# Patient Record
Sex: MALE | Race: WHITE | Employment: UNEMPLOYED | URBAN - METROPOLITAN AREA
[De-identification: names, ages, dates, MRNs, and addresses within clinical notes are randomized per-mention and may not be internally consistent; named-entity substitution may affect disease eponyms.]

---

## 2019-10-03 ENCOUNTER — THERAPY VISIT (OUTPATIENT)
Dept: PHYSICAL THERAPY | Facility: CLINIC | Age: 54
End: 2019-10-03
Payer: COMMERCIAL

## 2019-10-03 DIAGNOSIS — Z47.89 AFTERCARE FOLLOWING SURGERY OF THE MUSCULOSKELETAL SYSTEM: ICD-10-CM

## 2019-10-03 DIAGNOSIS — S83.282A TEAR OF LATERAL MENISCUS OF LEFT KNEE: ICD-10-CM

## 2019-10-03 PROCEDURE — 97110 THERAPEUTIC EXERCISES: CPT | Mod: GP | Performed by: PHYSICAL THERAPIST

## 2019-10-03 PROCEDURE — 97161 PT EVAL LOW COMPLEX 20 MIN: CPT | Mod: GP | Performed by: PHYSICAL THERAPIST

## 2019-10-03 NOTE — PROGRESS NOTES
"[facility] Initial Evaluation  Subjective:  Pt presents to PT s/p left knee scope lateral meniscus repair.  DOS= 10/1/19.  Pt reports that he injured the knee 2 months ago when he twisted the knee while walking and his knee \"popped out\".      The history is provided by the patient.   Type of problem:  Left knee   Condition occurred with:  A twist. This is a new condition    Patient reports pain:  Lateral.  Associated symptoms:  Loss of strength, edema and loss of motion/stiffness. Symptoms are exacerbated by activity and relieved by bracing/immobilizing and ice.                      Objective:    Gait:    Weight Bearing Status:  WBAT   Assistive Devices:  Crutches and brace                                                        Knee Evaluation:  ROM:      PROM    Hyperextension: Left: 0    Right:  8  Extension: Left: 8    Right:  0  Flexion: Left: 82    Right:  139      Strength:         Quad Set Left: Poor    Pain:           Edema:  Edema of the knee: Normal post-op swelling.            General     ROS    Assessment/Plan:    Patient is a 54 year old male with left side knee complaints.    Patient has the following significant findings with corresponding treatment plan.                Diagnosis 1:  Left lateral meniscus repair  Pain -  hot/cold therapy and education  Decreased ROM/flexibility - manual therapy, therapeutic exercise and home program  Decreased strength - therapeutic exercise, therapeutic activities and home program  Edema - cold therapy    Therapy Evaluation Codes:   1) History comprised of:   Personal factors that impact the plan of care:      None.    Comorbidity factors that impact the plan of care are:      None.     Medications impacting care: None.  2) Examination of Body Systems comprised of:   Body structures and functions that impact the plan of care:      Knee.   Activity limitations that impact the plan of care are:      Driving, Squatting/kneeling, Stairs and " Walking.  3) Clinical presentation characteristics are:   Stable/Uncomplicated.  4) Decision-Making    Low complexity using standardized patient assessment instrument and/or measureable assessment of functional outcome.  Cumulative Therapy Evaluation is: Low complexity.    Previous and current functional limitations:  (See Goal Flow Sheet for this information)    Short term and Long term goals: (See Goal Flow Sheet for this information)     Communication ability:  Patient appears to be able to clearly communicate and understand verbal and written communication and follow directions correctly.  Treatment Explanation - The following has been discussed with the patient:   RX ordered/plan of care  Anticipated outcomes  Possible risks and side effects  This patient would benefit from PT intervention to resume normal activities.   Rehab potential is good.    Frequency:  2 X week, once daily  Duration:  for 4 weeks tapering to 1 X a week over 6 weeks  Discharge Plan:  Achieve all LTG.  Independent in home treatment program.  Reach maximal therapeutic benefit.    Please refer to the daily flowsheet for treatment today, total treatment time and time spent performing 1:1 timed codes.

## 2019-10-04 ASSESSMENT — ACTIVITIES OF DAILY LIVING (ADL)
KNEEL ON THE FRONT OF YOUR KNEE: I AM UNABLE TO DO THE ACTIVITY
KNEE_ACTIVITY_OF_DAILY_LIVING_SUM: 16
STAND: I AM UNABLE TO DO THE ACTIVITY
SIT WITH YOUR KNEE BENT: I AM UNABLE TO DO THE ACTIVITY
WALK: I AM UNABLE TO DO THE ACTIVITY
HOW_WOULD_YOU_RATE_THE_OVERALL_FUNCTION_OF_YOUR_KNEE_DURING_YOUR_USUAL_DAILY_ACTIVITIES?: SEVERELY ABNORMAL
PAIN: THE SYMPTOM AFFECTS MY ACTIVITY SLIGHTLY
LIMPING: THE SYMPTOM AFFECTS MY ACTIVITY MODERATELY
AS_A_RESULT_OF_YOUR_KNEE_INJURY,_HOW_WOULD_YOU_RATE_YOUR_CURRENT_LEVEL_OF_DAILY_ACTIVITY?: SEVERELY ABNORMAL
GIVING WAY, BUCKLING OR SHIFTING OF KNEE: THE SYMPTOM AFFECTS MY ACTIVITY MODERATELY
SWELLING: THE SYMPTOM AFFECTS MY ACTIVITY MODERATELY
RAW_SCORE: 16
RISE FROM A CHAIR: I AM UNABLE TO DO THE ACTIVITY
GO UP STAIRS: ACTIVITY IS VERY DIFFICULT
GO DOWN STAIRS: I AM UNABLE TO DO THE ACTIVITY
STIFFNESS: I HAVE THE SYMPTOM BUT IT DOES NOT AFFECT MY ACTIVITY
WEAKNESS: THE SYMPTOM AFFECTS MY ACTIVITY MODERATELY
SQUAT: I AM UNABLE TO DO THE ACTIVITY
KNEE_ACTIVITY_OF_DAILY_LIVING_SCORE: 22.86
HOW_WOULD_YOU_RATE_THE_CURRENT_FUNCTION_OF_YOUR_KNEE_DURING_YOUR_USUAL_DAILY_ACTIVITIES_ON_A_SCALE_FROM_0_TO_100_WITH_100_BEING_YOUR_LEVEL_OF_KNEE_FUNCTION_PRIOR_TO_YOUR_INJURY_AND_0_BEING_THE_INABILITY_TO_PERFORM_ANY_OF_YOUR_USUAL_DAILY_ACTIVITIES?: 10

## 2019-10-04 NOTE — PROGRESS NOTES
[facility] Initial Evaluation  Subjective:                           Patient is retired.                           Objective:  System    Physical Exam    General     ROS    Assessment/Plan:

## 2019-10-07 ENCOUNTER — THERAPY VISIT (OUTPATIENT)
Dept: PHYSICAL THERAPY | Facility: CLINIC | Age: 54
End: 2019-10-07
Payer: COMMERCIAL

## 2019-10-07 DIAGNOSIS — Z47.89 AFTERCARE FOLLOWING SURGERY OF THE MUSCULOSKELETAL SYSTEM: ICD-10-CM

## 2019-10-07 DIAGNOSIS — S83.282A TEAR OF LATERAL MENISCUS OF LEFT KNEE: ICD-10-CM

## 2019-10-07 PROCEDURE — 97110 THERAPEUTIC EXERCISES: CPT | Mod: GP | Performed by: PHYSICAL THERAPIST

## 2019-10-11 ENCOUNTER — THERAPY VISIT (OUTPATIENT)
Dept: PHYSICAL THERAPY | Facility: CLINIC | Age: 54
End: 2019-10-11
Payer: COMMERCIAL

## 2019-10-11 DIAGNOSIS — S83.282A TEAR OF LATERAL MENISCUS OF LEFT KNEE: ICD-10-CM

## 2019-10-11 DIAGNOSIS — Z47.89 AFTERCARE FOLLOWING SURGERY OF THE MUSCULOSKELETAL SYSTEM: ICD-10-CM

## 2019-10-11 PROCEDURE — 97110 THERAPEUTIC EXERCISES: CPT | Mod: GP

## 2019-10-21 ENCOUNTER — ANESTHESIA (OUTPATIENT)
Dept: SURGERY | Facility: CLINIC | Age: 54
DRG: 857 | End: 2019-10-21
Payer: COMMERCIAL

## 2019-10-21 ENCOUNTER — HOSPITAL ENCOUNTER (INPATIENT)
Facility: CLINIC | Age: 54
LOS: 3 days | Discharge: HOME HEALTH | DRG: 857 | End: 2019-10-24
Attending: ORTHOPAEDIC SURGERY | Admitting: ORTHOPAEDIC SURGERY
Payer: COMMERCIAL

## 2019-10-21 ENCOUNTER — ANESTHESIA EVENT (OUTPATIENT)
Dept: SURGERY | Facility: CLINIC | Age: 54
DRG: 857 | End: 2019-10-21
Payer: COMMERCIAL

## 2019-10-21 DIAGNOSIS — Z98.890 S/P ARTHROSCOPIC KNEE SURGERY: Primary | ICD-10-CM

## 2019-10-21 LAB
APPEARANCE FLD: NORMAL
COLOR FLD: YELLOW
MONOS+MACROS NFR FLD MANUAL: 3 %
NEUTS BAND NFR FLD MANUAL: 97 %
SPECIMEN SOURCE FLD: NORMAL
WBC # FLD AUTO: NORMAL /UL

## 2019-10-21 PROCEDURE — 37000009 ZZH ANESTHESIA TECHNICAL FEE, EACH ADDTL 15 MIN: Performed by: ORTHOPAEDIC SURGERY

## 2019-10-21 PROCEDURE — 37000008 ZZH ANESTHESIA TECHNICAL FEE, 1ST 30 MIN: Performed by: ORTHOPAEDIC SURGERY

## 2019-10-21 PROCEDURE — 27110028 ZZH OR GENERAL SUPPLY NON-STERILE: Performed by: ORTHOPAEDIC SURGERY

## 2019-10-21 PROCEDURE — 25000128 H RX IP 250 OP 636: Performed by: NURSE ANESTHETIST, CERTIFIED REGISTERED

## 2019-10-21 PROCEDURE — 36000059 ZZH SURGERY LEVEL 3 EA 15 ADDTL MIN UMMC: Performed by: ORTHOPAEDIC SURGERY

## 2019-10-21 PROCEDURE — 87077 CULTURE AEROBIC IDENTIFY: CPT | Performed by: ORTHOPAEDIC SURGERY

## 2019-10-21 PROCEDURE — 25000132 ZZH RX MED GY IP 250 OP 250 PS 637: Performed by: PHYSICIAN ASSISTANT

## 2019-10-21 PROCEDURE — 87186 SC STD MICRODIL/AGAR DIL: CPT | Performed by: ORTHOPAEDIC SURGERY

## 2019-10-21 PROCEDURE — 87070 CULTURE OTHR SPECIMN AEROBIC: CPT | Performed by: ORTHOPAEDIC SURGERY

## 2019-10-21 PROCEDURE — 00000146 ZZHCL STATISTIC GLUCOSE BY METER IP

## 2019-10-21 PROCEDURE — 25000566 ZZH SEVOFLURANE, EA 15 MIN: Performed by: ORTHOPAEDIC SURGERY

## 2019-10-21 PROCEDURE — 71000015 ZZH RECOVERY PHASE 1 LEVEL 2 EA ADDTL HR: Performed by: ORTHOPAEDIC SURGERY

## 2019-10-21 PROCEDURE — 25000128 H RX IP 250 OP 636: Performed by: STUDENT IN AN ORGANIZED HEALTH CARE EDUCATION/TRAINING PROGRAM

## 2019-10-21 PROCEDURE — 25000125 ZZHC RX 250: Performed by: ORTHOPAEDIC SURGERY

## 2019-10-21 PROCEDURE — 71000014 ZZH RECOVERY PHASE 1 LEVEL 2 FIRST HR: Performed by: ORTHOPAEDIC SURGERY

## 2019-10-21 PROCEDURE — 12000001 ZZH R&B MED SURG/OB UMMC

## 2019-10-21 PROCEDURE — 87015 SPECIMEN INFECT AGNT CONCNTJ: CPT | Performed by: ORTHOPAEDIC SURGERY

## 2019-10-21 PROCEDURE — 27210794 ZZH OR GENERAL SUPPLY STERILE: Performed by: ORTHOPAEDIC SURGERY

## 2019-10-21 PROCEDURE — 87075 CULTR BACTERIA EXCEPT BLOOD: CPT | Performed by: ORTHOPAEDIC SURGERY

## 2019-10-21 PROCEDURE — 25000132 ZZH RX MED GY IP 250 OP 250 PS 637: Performed by: STUDENT IN AN ORGANIZED HEALTH CARE EDUCATION/TRAINING PROGRAM

## 2019-10-21 PROCEDURE — 25000125 ZZHC RX 250: Performed by: NURSE ANESTHETIST, CERTIFIED REGISTERED

## 2019-10-21 PROCEDURE — 25800030 ZZH RX IP 258 OP 636: Performed by: STUDENT IN AN ORGANIZED HEALTH CARE EDUCATION/TRAINING PROGRAM

## 2019-10-21 PROCEDURE — 40000170 ZZH STATISTIC PRE-PROCEDURE ASSESSMENT II: Performed by: ORTHOPAEDIC SURGERY

## 2019-10-21 PROCEDURE — 89051 BODY FLUID CELL COUNT: CPT | Performed by: ORTHOPAEDIC SURGERY

## 2019-10-21 PROCEDURE — 87205 SMEAR GRAM STAIN: CPT | Performed by: ORTHOPAEDIC SURGERY

## 2019-10-21 PROCEDURE — 0SBD4ZZ EXCISION OF LEFT KNEE JOINT, PERCUTANEOUS ENDOSCOPIC APPROACH: ICD-10-PCS | Performed by: ORTHOPAEDIC SURGERY

## 2019-10-21 PROCEDURE — 36000057 ZZH SURGERY LEVEL 3 1ST 30 MIN - UMMC: Performed by: ORTHOPAEDIC SURGERY

## 2019-10-21 RX ORDER — SODIUM CHLORIDE, SODIUM LACTATE, POTASSIUM CHLORIDE, CALCIUM CHLORIDE 600; 310; 30; 20 MG/100ML; MG/100ML; MG/100ML; MG/100ML
INJECTION, SOLUTION INTRAVENOUS CONTINUOUS
Status: DISCONTINUED | OUTPATIENT
Start: 2019-10-21 | End: 2019-10-21 | Stop reason: HOSPADM

## 2019-10-21 RX ORDER — OXYCODONE HYDROCHLORIDE 5 MG/1
5 TABLET ORAL EVERY 4 HOURS PRN
Status: DISCONTINUED | OUTPATIENT
Start: 2019-10-21 | End: 2019-10-22 | Stop reason: ALTCHOICE

## 2019-10-21 RX ORDER — ACETAMINOPHEN 325 MG/1
975 TABLET ORAL ONCE
Status: COMPLETED | OUTPATIENT
Start: 2019-10-21 | End: 2019-10-21

## 2019-10-21 RX ORDER — ACETAMINOPHEN 325 MG/1
975 TABLET ORAL ONCE
Status: CANCELLED | OUTPATIENT
Start: 2019-10-21 | End: 2019-10-21

## 2019-10-21 RX ORDER — SODIUM CHLORIDE, SODIUM LACTATE, POTASSIUM CHLORIDE, CALCIUM CHLORIDE 600; 310; 30; 20 MG/100ML; MG/100ML; MG/100ML; MG/100ML
INJECTION, SOLUTION INTRAVENOUS CONTINUOUS
Status: DISCONTINUED | OUTPATIENT
Start: 2019-10-21 | End: 2019-10-22 | Stop reason: HOSPADM

## 2019-10-21 RX ORDER — ONDANSETRON 2 MG/ML
4 INJECTION INTRAMUSCULAR; INTRAVENOUS EVERY 30 MIN PRN
Status: DISCONTINUED | OUTPATIENT
Start: 2019-10-21 | End: 2019-10-22 | Stop reason: HOSPADM

## 2019-10-21 RX ORDER — NALOXONE HYDROCHLORIDE 0.4 MG/ML
.1-.4 INJECTION, SOLUTION INTRAMUSCULAR; INTRAVENOUS; SUBCUTANEOUS
Status: DISCONTINUED | OUTPATIENT
Start: 2019-10-21 | End: 2019-10-22 | Stop reason: HOSPADM

## 2019-10-21 RX ORDER — OXYCODONE HYDROCHLORIDE 5 MG/1
5-10 TABLET ORAL
Status: DISCONTINUED | OUTPATIENT
Start: 2019-10-21 | End: 2019-10-24 | Stop reason: HOSPADM

## 2019-10-21 RX ORDER — HYDROMORPHONE HYDROCHLORIDE 1 MG/ML
.2-.4 INJECTION, SOLUTION INTRAMUSCULAR; INTRAVENOUS; SUBCUTANEOUS EVERY 10 MIN PRN
Status: DISCONTINUED | OUTPATIENT
Start: 2019-10-21 | End: 2019-10-22 | Stop reason: HOSPADM

## 2019-10-21 RX ORDER — ONDANSETRON 4 MG/1
4 TABLET, ORALLY DISINTEGRATING ORAL EVERY 30 MIN PRN
Status: DISCONTINUED | OUTPATIENT
Start: 2019-10-21 | End: 2019-10-22 | Stop reason: HOSPADM

## 2019-10-21 RX ORDER — ACETAMINOPHEN 325 MG/1
975 TABLET ORAL EVERY 8 HOURS
Status: DISCONTINUED | OUTPATIENT
Start: 2019-10-21 | End: 2019-10-24 | Stop reason: HOSPADM

## 2019-10-21 RX ORDER — LIDOCAINE HYDROCHLORIDE 20 MG/ML
INJECTION, SOLUTION INFILTRATION; PERINEURAL PRN
Status: DISCONTINUED | OUTPATIENT
Start: 2019-10-21 | End: 2019-10-21

## 2019-10-21 RX ORDER — MEPERIDINE HYDROCHLORIDE 25 MG/ML
12.5 INJECTION INTRAMUSCULAR; INTRAVENOUS; SUBCUTANEOUS
Status: DISCONTINUED | OUTPATIENT
Start: 2019-10-21 | End: 2019-10-22 | Stop reason: HOSPADM

## 2019-10-21 RX ORDER — FENTANYL CITRATE 50 UG/ML
50 INJECTION, SOLUTION INTRAMUSCULAR; INTRAVENOUS EVERY 5 MIN PRN
Status: COMPLETED | OUTPATIENT
Start: 2019-10-21 | End: 2019-10-21

## 2019-10-21 RX ORDER — QUETIAPINE FUMARATE 25 MG/1
25 TABLET, FILM COATED ORAL AT BEDTIME
COMMUNITY

## 2019-10-21 RX ORDER — BUPIVACAINE HYDROCHLORIDE AND EPINEPHRINE 2.5; 5 MG/ML; UG/ML
INJECTION, SOLUTION INFILTRATION; PERINEURAL PRN
Status: DISCONTINUED | OUTPATIENT
Start: 2019-10-21 | End: 2019-10-22 | Stop reason: HOSPADM

## 2019-10-21 RX ORDER — FENTANYL CITRATE 50 UG/ML
25-50 INJECTION, SOLUTION INTRAMUSCULAR; INTRAVENOUS EVERY 5 MIN PRN
Status: DISCONTINUED | OUTPATIENT
Start: 2019-10-21 | End: 2019-10-22 | Stop reason: HOSPADM

## 2019-10-21 RX ORDER — HYDROMORPHONE HYDROCHLORIDE 1 MG/ML
.3-.5 INJECTION, SOLUTION INTRAMUSCULAR; INTRAVENOUS; SUBCUTANEOUS
Status: DISCONTINUED | OUTPATIENT
Start: 2019-10-21 | End: 2019-10-23

## 2019-10-21 RX ORDER — DEXAMETHASONE SODIUM PHOSPHATE 4 MG/ML
INJECTION, SOLUTION INTRA-ARTICULAR; INTRALESIONAL; INTRAMUSCULAR; INTRAVENOUS; SOFT TISSUE PRN
Status: DISCONTINUED | OUTPATIENT
Start: 2019-10-21 | End: 2019-10-21

## 2019-10-21 RX ORDER — ACETAMINOPHEN 500 MG
1000 TABLET ORAL 3 TIMES DAILY PRN
Status: ON HOLD | COMMUNITY
End: 2019-10-24

## 2019-10-21 RX ORDER — CEFAZOLIN SODIUM 2 G/100ML
INJECTION, SOLUTION INTRAVENOUS PRN
Status: DISCONTINUED | OUTPATIENT
Start: 2019-10-21 | End: 2019-10-21

## 2019-10-21 RX ORDER — FENTANYL CITRATE 50 UG/ML
INJECTION, SOLUTION INTRAMUSCULAR; INTRAVENOUS PRN
Status: DISCONTINUED | OUTPATIENT
Start: 2019-10-21 | End: 2019-10-21

## 2019-10-21 RX ORDER — PROPOFOL 10 MG/ML
INJECTION, EMULSION INTRAVENOUS PRN
Status: DISCONTINUED | OUTPATIENT
Start: 2019-10-21 | End: 2019-10-21

## 2019-10-21 RX ADMIN — OXYCODONE HYDROCHLORIDE 5 MG: 5 TABLET ORAL at 23:16

## 2019-10-21 RX ADMIN — FENTANYL CITRATE 50 MCG: 50 INJECTION, SOLUTION INTRAMUSCULAR; INTRAVENOUS at 21:09

## 2019-10-21 RX ADMIN — FENTANYL CITRATE 50 MCG: 50 INJECTION INTRAMUSCULAR; INTRAVENOUS at 18:58

## 2019-10-21 RX ADMIN — FENTANYL CITRATE 50 MCG: 50 INJECTION, SOLUTION INTRAMUSCULAR; INTRAVENOUS at 21:01

## 2019-10-21 RX ADMIN — HYDROMORPHONE HYDROCHLORIDE 0.2 MG: 1 INJECTION, SOLUTION INTRAMUSCULAR; INTRAVENOUS; SUBCUTANEOUS at 22:31

## 2019-10-21 RX ADMIN — ONDANSETRON 4 MG: 2 INJECTION INTRAMUSCULAR; INTRAVENOUS at 21:37

## 2019-10-21 RX ADMIN — LIDOCAINE HYDROCHLORIDE 100 MG: 20 INJECTION, SOLUTION INFILTRATION; PERINEURAL at 20:40

## 2019-10-21 RX ADMIN — HYDROMORPHONE HYDROCHLORIDE 0.3 MG: 1 INJECTION, SOLUTION INTRAMUSCULAR; INTRAVENOUS; SUBCUTANEOUS at 22:45

## 2019-10-21 RX ADMIN — FENTANYL CITRATE 50 MCG: 50 INJECTION INTRAMUSCULAR; INTRAVENOUS at 18:53

## 2019-10-21 RX ADMIN — DEXAMETHASONE SODIUM PHOSPHATE 4 MG: 4 INJECTION, SOLUTION INTRAMUSCULAR; INTRAVENOUS at 20:52

## 2019-10-21 RX ADMIN — MIDAZOLAM 2 MG: 1 INJECTION INTRAMUSCULAR; INTRAVENOUS at 20:36

## 2019-10-21 RX ADMIN — ACETAMINOPHEN 975 MG: 325 TABLET, FILM COATED ORAL at 18:53

## 2019-10-21 RX ADMIN — CEFAZOLIN SODIUM 2 G: 2 INJECTION, SOLUTION INTRAVENOUS at 21:06

## 2019-10-21 RX ADMIN — FENTANYL CITRATE 25 MCG: 50 INJECTION INTRAMUSCULAR; INTRAVENOUS at 22:01

## 2019-10-21 RX ADMIN — FENTANYL CITRATE 100 MCG: 50 INJECTION, SOLUTION INTRAMUSCULAR; INTRAVENOUS at 20:40

## 2019-10-21 RX ADMIN — SODIUM CHLORIDE, POTASSIUM CHLORIDE, SODIUM LACTATE AND CALCIUM CHLORIDE: 600; 310; 30; 20 INJECTION, SOLUTION INTRAVENOUS at 21:22

## 2019-10-21 RX ADMIN — PROPOFOL 200 MG: 10 INJECTION, EMULSION INTRAVENOUS at 20:42

## 2019-10-21 RX ADMIN — FENTANYL CITRATE 50 MCG: 50 INJECTION INTRAMUSCULAR; INTRAVENOUS at 22:12

## 2019-10-21 RX ADMIN — SODIUM CHLORIDE, POTASSIUM CHLORIDE, SODIUM LACTATE AND CALCIUM CHLORIDE: 600; 310; 30; 20 INJECTION, SOLUTION INTRAVENOUS at 20:36

## 2019-10-21 ASSESSMENT — LIFESTYLE VARIABLES: TOBACCO_USE: 1

## 2019-10-21 ASSESSMENT — MIFFLIN-ST. JEOR: SCORE: 1952.56

## 2019-10-22 ENCOUNTER — APPOINTMENT (OUTPATIENT)
Dept: PHYSICAL THERAPY | Facility: CLINIC | Age: 54
DRG: 857 | End: 2019-10-22
Attending: ORTHOPAEDIC SURGERY
Payer: COMMERCIAL

## 2019-10-22 PROBLEM — Z98.890 S/P ARTHROSCOPIC KNEE SURGERY: Status: ACTIVE | Noted: 2019-10-22

## 2019-10-22 LAB
ALBUMIN SERPL-MCNC: 2.3 G/DL (ref 3.4–5)
ALP SERPL-CCNC: 80 U/L (ref 40–150)
ALT SERPL W P-5'-P-CCNC: 18 U/L (ref 0–70)
ANION GAP SERPL CALCULATED.3IONS-SCNC: 6 MMOL/L (ref 3–14)
AST SERPL W P-5'-P-CCNC: 14 U/L (ref 0–45)
BASOPHILS # BLD AUTO: 0 10E9/L (ref 0–0.2)
BASOPHILS NFR BLD AUTO: 0.1 %
BILIRUB DIRECT SERPL-MCNC: <0.1 MG/DL (ref 0–0.2)
BILIRUB SERPL-MCNC: 0.2 MG/DL (ref 0.2–1.3)
BUN SERPL-MCNC: 16 MG/DL (ref 7–30)
CALCIUM SERPL-MCNC: 8.3 MG/DL (ref 8.5–10.1)
CHLORIDE SERPL-SCNC: 99 MMOL/L (ref 94–109)
CO2 SERPL-SCNC: 28 MMOL/L (ref 20–32)
CREAT SERPL-MCNC: 0.88 MG/DL (ref 0.66–1.25)
CRP SERPL-MCNC: 270 MG/L (ref 0–8)
DIFFERENTIAL METHOD BLD: ABNORMAL
EOSINOPHIL # BLD AUTO: 0 10E9/L (ref 0–0.7)
EOSINOPHIL NFR BLD AUTO: 0.1 %
ERYTHROCYTE [DISTWIDTH] IN BLOOD BY AUTOMATED COUNT: 12 % (ref 10–15)
ERYTHROCYTE [SEDIMENTATION RATE] IN BLOOD BY WESTERGREN METHOD: 70 MM/H (ref 0–20)
GFR SERPL CREATININE-BSD FRML MDRD: >90 ML/MIN/{1.73_M2}
GLUCOSE BLDC GLUCOMTR-MCNC: 98 MG/DL (ref 70–99)
GLUCOSE SERPL-MCNC: 209 MG/DL (ref 70–99)
GRAM STN SPEC: NORMAL
HCT VFR BLD AUTO: 36.6 % (ref 40–53)
HGB BLD-MCNC: 11.9 G/DL (ref 13.3–17.7)
IMM GRANULOCYTES # BLD: 0 10E9/L (ref 0–0.4)
IMM GRANULOCYTES NFR BLD: 0.1 %
LYMPHOCYTES # BLD AUTO: 0.7 10E9/L (ref 0.8–5.3)
LYMPHOCYTES NFR BLD AUTO: 7 %
MCH RBC QN AUTO: 31.3 PG (ref 26.5–33)
MCHC RBC AUTO-ENTMCNC: 32.5 G/DL (ref 31.5–36.5)
MCV RBC AUTO: 96 FL (ref 78–100)
MONOCYTES # BLD AUTO: 1.2 10E9/L (ref 0–1.3)
MONOCYTES NFR BLD AUTO: 11.8 %
NEUTROPHILS # BLD AUTO: 8.1 10E9/L (ref 1.6–8.3)
NEUTROPHILS NFR BLD AUTO: 80.9 %
NRBC # BLD AUTO: 0 10*3/UL
NRBC BLD AUTO-RTO: 0 /100
PLATELET # BLD AUTO: 465 10E9/L (ref 150–450)
POTASSIUM SERPL-SCNC: 4.6 MMOL/L (ref 3.4–5.3)
PROT SERPL-MCNC: 6.6 G/DL (ref 6.8–8.8)
RBC # BLD AUTO: 3.8 10E12/L (ref 4.4–5.9)
SODIUM SERPL-SCNC: 133 MMOL/L (ref 133–144)
SPECIMEN SOURCE: NORMAL
WBC # BLD AUTO: 10.1 10E9/L (ref 4–11)

## 2019-10-22 PROCEDURE — 99222 1ST HOSP IP/OBS MODERATE 55: CPT | Performed by: PHYSICIAN ASSISTANT

## 2019-10-22 PROCEDURE — 25800030 ZZH RX IP 258 OP 636: Performed by: ORTHOPAEDIC SURGERY

## 2019-10-22 PROCEDURE — 97116 GAIT TRAINING THERAPY: CPT | Mod: GP

## 2019-10-22 PROCEDURE — 80048 BASIC METABOLIC PNL TOTAL CA: CPT | Performed by: ORTHOPAEDIC SURGERY

## 2019-10-22 PROCEDURE — 85652 RBC SED RATE AUTOMATED: CPT | Performed by: ORTHOPAEDIC SURGERY

## 2019-10-22 PROCEDURE — 25000132 ZZH RX MED GY IP 250 OP 250 PS 637: Performed by: ORTHOPAEDIC SURGERY

## 2019-10-22 PROCEDURE — 97530 THERAPEUTIC ACTIVITIES: CPT | Mod: GP

## 2019-10-22 PROCEDURE — 36415 COLL VENOUS BLD VENIPUNCTURE: CPT | Performed by: ORTHOPAEDIC SURGERY

## 2019-10-22 PROCEDURE — 25000128 H RX IP 250 OP 636: Performed by: ORTHOPAEDIC SURGERY

## 2019-10-22 PROCEDURE — 25000132 ZZH RX MED GY IP 250 OP 250 PS 637: Performed by: STUDENT IN AN ORGANIZED HEALTH CARE EDUCATION/TRAINING PROGRAM

## 2019-10-22 PROCEDURE — 97161 PT EVAL LOW COMPLEX 20 MIN: CPT | Mod: GP

## 2019-10-22 PROCEDURE — 85025 COMPLETE CBC W/AUTO DIFF WBC: CPT | Performed by: ORTHOPAEDIC SURGERY

## 2019-10-22 PROCEDURE — 12000001 ZZH R&B MED SURG/OB UMMC

## 2019-10-22 PROCEDURE — 80076 HEPATIC FUNCTION PANEL: CPT | Performed by: ORTHOPAEDIC SURGERY

## 2019-10-22 PROCEDURE — 99207 ZZC CONSULT E&M CHANGED TO INITIAL LEVEL: CPT | Performed by: PHYSICIAN ASSISTANT

## 2019-10-22 PROCEDURE — 86140 C-REACTIVE PROTEIN: CPT | Performed by: ORTHOPAEDIC SURGERY

## 2019-10-22 RX ORDER — CEFAZOLIN SODIUM 2 G/100ML
2 INJECTION, SOLUTION INTRAVENOUS EVERY 8 HOURS
Status: DISCONTINUED | OUTPATIENT
Start: 2019-10-22 | End: 2019-10-22

## 2019-10-22 RX ORDER — BISACODYL 10 MG
10 SUPPOSITORY, RECTAL RECTAL DAILY
Status: DISCONTINUED | OUTPATIENT
Start: 2019-10-22 | End: 2019-10-24 | Stop reason: HOSPADM

## 2019-10-22 RX ORDER — SODIUM CHLORIDE, SODIUM LACTATE, POTASSIUM CHLORIDE, CALCIUM CHLORIDE 600; 310; 30; 20 MG/100ML; MG/100ML; MG/100ML; MG/100ML
INJECTION, SOLUTION INTRAVENOUS CONTINUOUS
Status: DISCONTINUED | OUTPATIENT
Start: 2019-10-22 | End: 2019-10-22

## 2019-10-22 RX ORDER — AMOXICILLIN 250 MG
1 CAPSULE ORAL 2 TIMES DAILY
Status: DISCONTINUED | OUTPATIENT
Start: 2019-10-22 | End: 2019-10-24 | Stop reason: HOSPADM

## 2019-10-22 RX ORDER — AMOXICILLIN 250 MG
2 CAPSULE ORAL 2 TIMES DAILY
Status: DISCONTINUED | OUTPATIENT
Start: 2019-10-22 | End: 2019-10-24 | Stop reason: HOSPADM

## 2019-10-22 RX ORDER — QUETIAPINE FUMARATE 25 MG/1
25 TABLET, FILM COATED ORAL AT BEDTIME
Status: DISCONTINUED | OUTPATIENT
Start: 2019-10-22 | End: 2019-10-24 | Stop reason: HOSPADM

## 2019-10-22 RX ORDER — PROCHLORPERAZINE MALEATE 5 MG
10 TABLET ORAL EVERY 6 HOURS PRN
Status: DISCONTINUED | OUTPATIENT
Start: 2019-10-22 | End: 2019-10-24 | Stop reason: HOSPADM

## 2019-10-22 RX ORDER — ONDANSETRON 4 MG/1
4 TABLET, ORALLY DISINTEGRATING ORAL EVERY 6 HOURS PRN
Status: DISCONTINUED | OUTPATIENT
Start: 2019-10-22 | End: 2019-10-24 | Stop reason: HOSPADM

## 2019-10-22 RX ORDER — LANOLIN ALCOHOL/MO/W.PET/CERES
3 CREAM (GRAM) TOPICAL
Status: DISCONTINUED | OUTPATIENT
Start: 2019-10-22 | End: 2019-10-24 | Stop reason: HOSPADM

## 2019-10-22 RX ORDER — NICOTINE 21 MG/24HR
1 PATCH, TRANSDERMAL 24 HOURS TRANSDERMAL DAILY
Status: DISCONTINUED | OUTPATIENT
Start: 2019-10-22 | End: 2019-10-24 | Stop reason: HOSPADM

## 2019-10-22 RX ORDER — CALCIUM CARBONATE 500 MG/1
1000 TABLET, CHEWABLE ORAL 4 TIMES DAILY PRN
Status: DISCONTINUED | OUTPATIENT
Start: 2019-10-22 | End: 2019-10-24 | Stop reason: HOSPADM

## 2019-10-22 RX ORDER — ONDANSETRON 2 MG/ML
4 INJECTION INTRAMUSCULAR; INTRAVENOUS EVERY 6 HOURS PRN
Status: DISCONTINUED | OUTPATIENT
Start: 2019-10-22 | End: 2019-10-24 | Stop reason: HOSPADM

## 2019-10-22 RX ORDER — LIDOCAINE 40 MG/G
CREAM TOPICAL
Status: DISCONTINUED | OUTPATIENT
Start: 2019-10-22 | End: 2019-10-24

## 2019-10-22 RX ORDER — NALOXONE HYDROCHLORIDE 0.4 MG/ML
.1-.4 INJECTION, SOLUTION INTRAMUSCULAR; INTRAVENOUS; SUBCUTANEOUS
Status: DISCONTINUED | OUTPATIENT
Start: 2019-10-22 | End: 2019-10-24 | Stop reason: HOSPADM

## 2019-10-22 RX ADMIN — SENNOSIDES AND DOCUSATE SODIUM 2 TABLET: 8.6; 5 TABLET ORAL at 20:06

## 2019-10-22 RX ADMIN — ACETAMINOPHEN 975 MG: 325 TABLET, FILM COATED ORAL at 01:15

## 2019-10-22 RX ADMIN — OXYCODONE HYDROCHLORIDE 10 MG: 5 TABLET ORAL at 23:48

## 2019-10-22 RX ADMIN — CEFAZOLIN SODIUM 2 G: 2 INJECTION, SOLUTION INTRAVENOUS at 13:24

## 2019-10-22 RX ADMIN — OXYCODONE HYDROCHLORIDE 10 MG: 5 TABLET ORAL at 20:06

## 2019-10-22 RX ADMIN — OXYCODONE HYDROCHLORIDE 5 MG: 5 TABLET ORAL at 01:15

## 2019-10-22 RX ADMIN — ACETAMINOPHEN 975 MG: 325 TABLET, FILM COATED ORAL at 16:19

## 2019-10-22 RX ADMIN — OXYCODONE HYDROCHLORIDE 10 MG: 5 TABLET ORAL at 08:04

## 2019-10-22 RX ADMIN — SODIUM CHLORIDE, POTASSIUM CHLORIDE, SODIUM LACTATE AND CALCIUM CHLORIDE: 600; 310; 30; 20 INJECTION, SOLUTION INTRAVENOUS at 04:49

## 2019-10-22 RX ADMIN — OXYCODONE HYDROCHLORIDE 10 MG: 5 TABLET ORAL at 13:22

## 2019-10-22 RX ADMIN — OMEPRAZOLE 20 MG: 20 CAPSULE, DELAYED RELEASE ORAL at 08:04

## 2019-10-22 RX ADMIN — OXYCODONE HYDROCHLORIDE 10 MG: 5 TABLET ORAL at 04:50

## 2019-10-22 RX ADMIN — OXYCODONE HYDROCHLORIDE 10 MG: 5 TABLET ORAL at 16:19

## 2019-10-22 RX ADMIN — VANCOMYCIN HYDROCHLORIDE 2000 MG: 10 INJECTION, POWDER, LYOPHILIZED, FOR SOLUTION INTRAVENOUS at 17:34

## 2019-10-22 RX ADMIN — SENNOSIDES AND DOCUSATE SODIUM 2 TABLET: 8.6; 5 TABLET ORAL at 08:04

## 2019-10-22 RX ADMIN — ACETAMINOPHEN 975 MG: 325 TABLET, FILM COATED ORAL at 08:04

## 2019-10-22 RX ADMIN — QUETIAPINE FUMARATE 25 MG: 25 TABLET ORAL at 01:15

## 2019-10-22 RX ADMIN — ASPIRIN 325 MG: 325 TABLET, DELAYED RELEASE ORAL at 08:04

## 2019-10-22 RX ADMIN — QUETIAPINE FUMARATE 25 MG: 25 TABLET ORAL at 22:07

## 2019-10-22 RX ADMIN — CEFAZOLIN SODIUM 2 G: 2 INJECTION, SOLUTION INTRAVENOUS at 04:49

## 2019-10-22 RX ADMIN — NICOTINE 1 PATCH: 14 PATCH, EXTENDED RELEASE TRANSDERMAL at 08:04

## 2019-10-22 RX ADMIN — ACETAMINOPHEN 975 MG: 325 TABLET, FILM COATED ORAL at 23:48

## 2019-10-22 ASSESSMENT — ACTIVITIES OF DAILY LIVING (ADL)
ADLS_ACUITY_SCORE: 12
ADLS_ACUITY_SCORE: 20
DRESS: 0-->INDEPENDENT
ADLS_ACUITY_SCORE: 20
ADLS_ACUITY_SCORE: 12
RETIRED_EATING: 0-->INDEPENDENT
ADLS_ACUITY_SCORE: 12

## 2019-10-22 NOTE — BRIEF OP NOTE
[facility], [place]    Brief Operative Note    Pre-operative diagnosis: Septic arthritis left knee s/p inside out lateral meniscus repair  Post-operative diagnosis Same as pre-operative diagnosis    Procedure: Procedure(s):  IRRIGATION AND DEBRIDEMENT, KNEE, ARTHROSCOPIC  Surgeon: Surgeon(s) and Role:     * Bryan Spain MD - Primary     * Matt Zurita MD - Resident - Assisting  Anesthesia: General   Estimated blood loss: Minimal  Drains: None  Specimens:   ID Type Source Tests Collected by Time Destination   1 : Left Knee Fluid Fluid Knee, Left ANAEROBIC BACTERIAL CULTURE, CELL COUNT WITH DIFFERENTIAL FLUID, FLUID CULTURE AEROBIC BACTERIAL, GRAM STAIN Bryan Spain MD 10/21/2019  9:00 PM      Findings:   Purulent, cloudy fluid left knee upon scope entry   Complications: None.  Implants: * No implants in log *    Post-Op Plan:  Assessment/Plan: Alexis Patiño is a 54 year old male with a presumed septic arthritis of the left knee s/p arthroscopic I&D on 10/21 with Dr. Spain. Patient is now nearly 3 weeks s/p left knee arthroscopy, inside out repair lateral meniscus with Dr. Spain on 10/1/19.     Activity: Up with assist and assistive devices as needed  Weight bearing status: WBAT when in full extension  Brace: KI overnight in extension to quiet joint down, then meniscus repair protocol   -transition to hinged knee brace in 24h   -then, WBAT locked in extension when mobilizing   -0-90 ROM when working with therapy and at rest/in chair  Antibiotics: Ancef continuous; appreciate ID recommendations   Tailor to cultures    PICC likely to be needed   Diet: Begin with clear fluids and progress diet as tolerated. Bowel regimen. Anti-emetics PRN.   DVT prophylaxis: mechanical and ASA while inpatient,  discharge on ASA 325mg daily x 2 weeks.  Elevation: Elevate heels off of bed on pillows. No pillows behind the knee at any time.   Wound Care: steristrips and  primapor x3 to 3 portal sites; ACE; reinforce/change as needed  Drains: none  Pain management: transition from IV to orals as tolerated.    Resume appropriate home meds; nicotine patch PRN  Physical Therapy:ROM, ADL's.   Labs: CBC, BMP, ESR, CRP  Cultures: Pending, follow culture results closely ; clinical aspiration and intraop fluid cultures  Consults: PT, OT. Hospitalist , Infectious Disease; appreciate assistance in caring for this patient.   Follow-up: Clinic in 2 weeks for wound check with Dr. Spain or his PA Jw Paz PA-C    Disposition: Pending infectious disease recommendations and stability, anticipate discharge to Home on POD 2-3    Matt Zurita MD 10/21/2019  Orthopaedic Surgery Resident  Pager: [phone]

## 2019-10-22 NOTE — ANESTHESIA POSTPROCEDURE EVALUATION
Anesthesia POST Procedure Evaluation    Patient: Alexis Patiño   MRN:     [id number] Gender:   male   Age:    54 year old :      1965        Preoperative Diagnosis: * No pre-op diagnosis entered *   Procedure(s):  IRRIGATION AND DEBRIDEMENT, KNEE, ARTHROSCOPIC   Postop Comments: No value filed.       Anesthesia Type:  Not documented  General    Reportable Event: NO     PAIN: Uncomplicated   Sign Out status: Comfortable, Well controlled pain     PONV: No PONV   Sign Out status:  No Nausea or Vomiting     Neuro/Psych: Uneventful perioperative course   Sign Out Status: Preoperative baseline; Age appropriate mentation     Airway/Resp.: Uneventful perioperative course   Sign Out Status: Non labored breathing, age appropriate RR; Resp. Status within EXPECTED Parameters     CV: Uneventful perioperative course   Sign Out status: CV Support within EXPECTED Parameters     Disposition:   Sign Out in:  PACU  Disposition:  Floor  Recovery Course: Uneventful  Follow-Up: Not required     Comments/Narrative:  Patient comfortable. BP within 20% preoperative baseline. Patient tachycardic for past few days per wife, now 110s. ToleratingPO           Last Anesthesia Record Vitals:  CRNA VITALS  10/21/2019 2113 - 10/21/2019 2213      10/21/2019             Pulse:  110    Ht Rate:  111    SpO2:  98 %    Resp Rate (observed):  (!) 3          Last PACU Vitals:  Vitals Value Taken Time   /94 10/21/2019 11:15 PM   Temp 37.7  C (99.8  F) 10/21/2019 11:00 PM   Pulse 112 10/21/2019 11:15 PM   Resp 14 10/21/2019 11:17 PM   SpO2 95 % 10/21/2019 11:17 PM   Temp src     NIBP     Pulse     SpO2     Resp     Temp     Ht Rate     Temp 2     Vitals shown include unvalidated device data.      Electronically Signed By: Aleida Alvarado MD, 2019, 11:18 PM

## 2019-10-22 NOTE — ANESTHESIA CARE TRANSFER NOTE
Patient: Alexis Patiño    Procedure(s):  IRRIGATION AND DEBRIDEMENT, KNEE, ARTHROSCOPIC    Diagnosis: * No pre-op diagnosis entered *  Diagnosis Additional Information: No value filed.    Anesthesia Type:   General     Note:  Airway :Face Mask  Patient transferred to:PACU  Comments: T 37.6, RR 20.  Handoff Report: Identifed the Patient, Identified the Reponsible Provider, Reviewed the pertinent medical history, Discussed the surgical course, Reviewed Intra-OP anesthesia mangement and issues during anesthesia, Set expectations for post-procedure period and Allowed opportunity for questions and acknowledgement of understanding      Vitals: (Last set prior to Anesthesia Care Transfer)    CRNA VITALS  10/21/2019 2113 - 10/21/2019 2148      10/21/2019             Pulse:  110    Ht Rate:  111    SpO2:  98 %    Resp Rate (observed):  (!) 3                Electronically Signed By: ROSA iMller CRNA  October 21, 2019  9:48 PM

## 2019-10-22 NOTE — PLAN OF CARE
VS: Temp: 97.4  F (36.3  C) Temp src: Oral BP: (!) 146/85 Pulse: 100 Heart Rate: 93 Resp: 16 SpO2: 92 % O2 Device: None (Room air)    O2: Stable on room air   Output: Voiding spontaneously and without difficulty   Last BM: 10/20/2019   Activity: WBAT, up to bathroom with crutches   Skin: Intact. Pt did refused to turn to allow for assessment of back and buttock   Pain: Controlled with 10 mg oxy Q3H   CMS: Intact other than numbness present in L heel   Dressing: Intact   Diet: Regular   LDA: PIV on L forearm, SL between abx infusions   Equipment: IV pole, CAPNO machine, IV pump, knee immobilizer   Plan: Continue IV abx, continue to monitor throughout shift and intervene when necessary.   Additional Info:

## 2019-10-22 NOTE — ANESTHESIA PREPROCEDURE EVALUATION
"Anesthesia Pre-Procedure Evaluation    Patient: Alexis Patiño   MRN:     [id number] Gender:   male   Age:    54 year old :      1965        Preoperative Diagnosis: * No pre-op diagnosis entered *   Procedure(s):  IRRIGATION AND DEBRIDEMENT, KNEE, ARTHROSCOPIC     History reviewed. No pertinent past medical history.   History reviewed. No pertinent surgical history.       Anesthesia Evaluation     . Pt has had prior anesthetic. Type: General and Regional    History of anesthetic complications (sore throat)    reports he \"woke up\" during spine surgery in       ROS/MED HX    ENT/Pulmonary: Comment: Used inhaler this morning due to pain    (+)sleep apnea, tobacco use, Current use doesn't use CPAP , . .   (-) recent URI   Neurologic:  - neg neurologic ROS     Cardiovascular:     (+) Dyslipidemia, hypertension----. : . . . :. . Previous cardiac testing date:results:Stress Testdate: results: Satisfactory myocardial perfusion exercise stress test; patient's heart rate increased to   148 BPM at peak stress or 89 % of his age-   predicted maximum heart rate.   2.  Negative Tcm Tetrofosmin myocardial perfusion stress study; no evidence of ischemia.       -Diaphramatic attenuation (see comments) may reduce the sensitity of this examination. date: results: date: results:          METS/Exercise Tolerance: Comment: Limited by leg pain 1 - Eating, dressing   Hematologic:  - neg hematologic  ROS       Musculoskeletal: Comment: H/o lumbar back surgery  (+)  other musculoskeletal-       GI/Hepatic: Comment:  sclerosing mesenteritis, resolved        Renal/Genitourinary:  - ROS Renal section negative       Endo:     (+) Obesity, .      Psychiatric:     (+) psychiatric history depression and other (comment) (high EtOH use, no h/o w/d)      Infectious Disease:   (+) Recent Fever (knee infection with fevers),       Malignancy:      - no malignancy   Other: Comment: Reports tramadol doesn't work   (+) H/O Chronic Pain,   "                     PHYSICAL EXAM:   Mental Status/Neuro: A/A/O   Airway: Facies: Thick Neck  Mallampati: III  Mouth/Opening: Full  TM distance: > 6 cm  Neck ROM: Full   Respiratory: Auscultation: CTAB     Resp. Rate: Normal     Resp. Effort: Normal      CV: Rhythm: Regular  Rate: Age appropriate  Heart: Normal Sounds  Edema: None   Comments: C/o pain     Dental: Details                  LABS:  CBC: No results found for: WBC, HGB, HCT, PLT  BMP: No results found for: NA, POTASSIUM, CHLORIDE, CO2, BUN, CR, GLC  COAGS: No results found for: PTT, INR, FIBR  POC: No results found for: BGM, HCG, HCGS  OTHER: No results found for: PH, LACT, A1C, CARLOS, PHOS, MAG, ALBUMIN, PROTTOTAL, ALT, AST, GGT, ALKPHOS, BILITOTAL, BILIDIRECT, LIPASE, AMYLASE, JASPAL, TSH, T4, T3, CRP, SED     Preop Vitals    BP Readings from Last 3 Encounters:   10/21/19 (!) 142/97    Pulse Readings from Last 3 Encounters:   No data found for Pulse      Resp Readings from Last 3 Encounters:   10/21/19 20    SpO2 Readings from Last 3 Encounters:   10/21/19 97%      Temp Readings from Last 1 Encounters:   10/21/19 37.3  C (99.1  F) (Oral)    Ht Readings from Last 1 Encounters:   10/21/19 1.829 m (6')      Wt Readings from Last 1 Encounters:   10/21/19 108 kg (238 lb)    Estimated body mass index is 32.28 kg/m  as calculated from the following:    Height as of this encounter: 1.829 m (6').    Weight as of this encounter: 108 kg (238 lb).     LDA:        Assessment:   ASA SCORE: 3    H&P: History and physical reviewed and following examination; no interval change.     Smoking Status:  Active Smoker       - patient did not smoke on day of surgery       - not instructed to abstain from smoking on day of procedure   NPO Status: NPO Appropriate     Plan:   Anes. Type:  General   Pre-Medication: Midazolam (fentanyl)   Induction:  IV (Standard)   Airway: LMA   Access/Monitoring: PIV   Maintenance: Balanced     Postop Plan:   Postop Pain: Opioids  Postop  Sedation/Airway: Not planned  Disposition: Inpatient/Admit     PONV Management:   Adult Risk Factors:, Postop Opioids   Prevention: Ondansetron     CONSENT: Direct conversation   Plan and risks discussed with: Patient   Blood Products: Consent Deferred (Minimal Blood Loss)       Comments for Plan/Consent:  Discussed risks of anesthesia including nausea, vomiting, sore throat, dental damage, cardiopulmonary complications, neurologic complications, and serious complications.                   Aleida Alvarado MD

## 2019-10-22 NOTE — OP NOTE
Procedure Date: 10/21/2019      PREOPERATIVE DIAGNOSIS:  Probable septic left knee secondary to lateral meniscal repair.      POSTOPERATIVE DIAGNOSIS:  Probable septic left knee secondary to lateral meniscal repair..      SURGEON:  Bryan Spain MD      ASSISTANT:  Matt Zurita MD, Orthopedic Resident      ANESTHETIC:  General.      DRAINS:  None.      COUNTS:  Sponge and needle count were correct.      MATERIAL FORWARDED TO THE LABORATORY:  Intra-articular fluid for cell count, anaerobic and aerobic culture.      OPERATION PERFORMED:  Left knee arthroscopic debridement and irrigation.      INDICATIONS:  Alexis Patiño is a 54-year-old male who underwent a left lateral meniscus repair on 10/01/2019.  He initially did quite well.  On10/12/2019, he had an event where his brace was unlocked and he flexed his knee.  He had some increased pain but overall was doing reasonably well until recently.  Yesterday, Alexis tells me that he developed much more significant swelling and pain.  He has had a low-grade fever.  He presented to the Emergency Department in [place] where blood work was obtained.  His CRP was 32.  His white count was 10.7.  I instructed Alexis to come to [facility] [place] to see my physician assistant, Jw Paz.  A knee aspiration was performed which returned cloudy yellow fluid.  Based on the appearance of the fluid, I recommended an arthroscopic debridement and irrigation.  I met Alexis and his wife in the preoperative holding area.  We had a long discussion about his knee.  I shared my concern about a possible intra-articular infection.  I explained that it may be better to go ahead and perform a debridement and irrigation tonight rather than wait for final cultures.  We discussed the risks of surgery, including bleeding and nerve damage, complications from anesthesia, blood clot, etc.  I also explained the more pertinent risks with this type of surgery, including a possibility that  we were unable to identify the microorganism.  One debridement and irrigation may not be adequate and further arthroscopic or even open surgery may be necessary.  He may end up with stiffness or scar tissue that could be problematic and may require management in the future.  I explained to Alexis that the inflammation and infection in his knee may be harmful to his articular cartilage surfaces and this may advance his degenerative changes.  He understands the surgery, as well as the surgical risks, and would like to proceed.      OPERATIVE FINDINGS:  Examination under anesthesia reveals a 3+ effusion.  No erythema.  Incisions are well healed.  The diagnostic arthroscopy reveals cloudy yellow fluid.  There is fibrinous synovitis throughout the knee joint.  The lateral meniscus repair is intact and stable to probing.  There is grade 2-3 chondral wear of the medial femoral condyle.      IMPLANTS:  None.      DESCRIPTION OF THE OPERATION:  After the patient was counseled, plans, alternatives and risks were discussed, consent was obtained.  The correct operative extremity was marked in the preoperative holding area.  The patient was brought back to the operating suite and administered a general anesthetic.  The left lower extremity was prepped and draped in the usual sterile fashion.  A standard anterolateral arthroscopy portal was made through the previous incision, a paramedial outflow portal was created to facilitate the debridement and irrigation, and an anteromedial portal was made through the patient's previous incision.  A thorough diagnostic arthroscopy was undertaken.  A motorized resector was used to vigorously debride the fibrinous synovium within the suprapatellar pouch, medial and lateral gutters.        Attention was now turned to the anterior aspect of the knee.  The fibrinous synovitis was debrided.  The synovitis around the cruciate ligaments was debrided.  The lateral meniscus repair was carefully  probed and noted to be stable.  Twelve liters of fluid was used to perform the irrigation.  The arthroscopic instruments were removed and the portal sites closed with nylon suture.  A sterile dressing was applied.  The patient was put in a knee immobilizer and extubated on the operating room table.  He was taken to the recovery room in good condition.  He tolerated the procedure well.  There were no complications.  Estimated blood loss was 10 mL.  A tourniquet was used for 23 minutes at 250 mmHg.      DISPOSITION:  The patient will be admitted to .  He will continue to follow a meniscus repair rehab protocol with weightbearing as tolerated in full extension.  His range of motion will be 0-90.  However, we will hold him in extension tomorrow to allow his joint to quiet down.  He will be continued on Ancef until evaluated by Infectious Disease.  I would expect him to have a PICC line placed either tomorrow or Wednesday.  We will use aspirin for DVT prophylaxis.  If his knee exam is concerning, we may consider a repeat debridement and irrigation on Wednesday or Thursday.         YARA ALY MD             D: 10/21/2019   T: 10/22/2019   MT: MADELINE      Name:     LALO PANDYA   MRN:      [id number]        Account:        [id number]   :      1965           Procedure Date: 10/21/2019      Document: [id number]

## 2019-10-22 NOTE — CONSULTS
GENERAL INFECTIOUS DISEASES CONSULTATION     Patient:  Alexis Patiño   Date of birth 1965, Medical record number [id number]  Date of Visit:  10/22/2019  Date of Admission: 10/21/2019  Consult Requested by:Bryan Spain MD  Reason for Consult:  Septic arthritis           Assessment and Recommendations:     Alexis Patiño is a 53 yo male with history of heavy tobacco use, obesity, admitted on 10/21/19 for worsening left knee pain and swelling, fevers and sweating in the past 10 days s/p recent arthroscopy with inside out repair of his lateral meniscus on 10/1/19 with Dr Spain. ( first 12 days post surgery, hge was doing well, tolerating PT and weight bearing) He went to ER and US was negative for DVT.  CRP> 32, WBC 10.7.  He later went to see Dr Spain's PA and a knee aspiration was done. fluid was purulent/ cloudy with >100,000 WBCs with PMN predominance.     1. Left septic knee post arthroscopy with inside out repair of his lateral meniscus on 10/1/19  - fluid analysis cloudy, yellow WBC 40828 Neut 97% Gram stain - many WBCs and predom PMNs, Cultures are pending   - left knee arthroscopic debridement and irrigation on 10/21/19.      RECOMMENDATION:  - follow-up on cultures ( also from outside lab)  - recommend broaden antibiotic coverage - to use Vancomycin IV ( stop cefazolin)     Plan discussed with primary team     Thank you for allowing us to participate in the care of this patient    Kale Nickerson MD, M.Med.Sc  Staff, Infectious Diseases   Pager : [phone]         History of Present Illness:     Alexis Patiño is a 53 yo male with history of heavy tobacco use, obesity, admitted on 10/21/19 for worsening left knee pain and swelling, fevers and sweating in the past 10 days s/p recent arthroscopy with inside out repair of his lateral meniscus on 10/1/19 with Dr Spain. ( first 12 days post surgery, hge was doing well, tolerating PT and weight bearing) He went to ER and US was negative  for DVT.  CRP> 32, WBC 10.7.  He later went to see Dr Spain's PA and a knee aspiration was done. fluid was purulent/ cloudy with >100,000 WBCs with PMN predominance.     He had left knee arthroscopic debridement and irrigation on 10/21/19.    OPERATIVE FINDINGS:  Examination under anesthesia reveals a 3+ effusion.  No erythema.  Incisions are well healed.  The diagnostic arthroscopy reveals cloudy yellow fluid.  There is fibrinous synovitis throughout the knee joint.  The lateral meniscus repair is intact and stable to probing.  There is grade 2-3 chondral wear of the medial femoral condyle.     fluid analysis cloudy, yellow WBC 12936 Neut 97% Gram stain - many WBCs and predom PMNs, Cultures are pending     ESR 70,  on 10/22/19     Antibiotics  - Cefazolin 10/21/19--> present ( was not on antibiotic prehospitalization)    Recent culture results include:  Culture Micro   Date Value Ref Range Status   10/21/2019 Culture negative monitoring continues  Preliminary   10/21/2019 Culture negative monitoring continues  Preliminary          Review of Systems:   CONSTITUTIONAL:  see HPI   EYES: negative for icterus  ENT:  negative for hearing loss, tinnitus and sore throat  RESPIRATORY:  negative for cough with sputum and dyspnea  CARDIOVASCULAR:  negative for chest pain, dyspnea  GASTROINTESTINAL:  negative for nausea, vomiting, diarrhea and constipation  GENITOURINARY:  negative for dysuria  HEME:  No easy bruising  INTEGUMENT:  see HPI   NEURO:  Negative for headache         Past Medical History:     Past Medical History:   Diagnosis Date     GERD (gastroesophageal reflux disease)      Hypertriglyceridemia      Major depressive disorder      TANJA (obstructive sleep apnea)      Sclerosing mesenteritis (H)           Past Surgical History:     Past Surgical History:   Procedure Laterality Date     ARTHROSCOPY KNEE IRRIGATION AND DEBRIDEMENT Left 10/21/2019    Procedure: Left knee arthroscopic irrigation and  debridement;  Surgeon: Bryan Spain MD;  Location: [facility] OR     BACK SURGERY  1996    L4-S1           Family History:     Family History   Problem Relation Age of Onset     Unknown/Adopted Mother      Unknown/Adopted Father           Social History:     Social History     Tobacco Use     Smoking status: Current Every Day Smoker     Packs/day: 0.00   Substance Use Topics     Alcohol use: Yes     History   Sexual Activity     Sexual activity: Not on file          Current Medications (antimicrobials listed in bold):       acetaminophen  975 mg Oral Q8H     aspirin  325 mg Oral Daily     bisacodyl  10 mg Rectal Daily     ceFAZolin  2 g Intravenous Q8H     magnesium hydroxide  15 mL Oral BID     nicotine  1 patch Transdermal Daily     nicotine   Transdermal Q8H     nicotine   Transdermal Daily     omeprazole  20 mg Oral Daily     QUEtiapine  25 mg Oral At Bedtime     senna-docusate  1 tablet Oral BID    Or     senna-docusate  2 tablet Oral BID     sodium chloride (PF)  3 mL Intracatheter Q8H          Allergies:     Allergies   Allergen Reactions     Lexapro [Escitalopram] Angioedema          Physical Exam:   Vitals were reviewed  Patient Vitals for the past 24 hrs:   BP Temp Temp src Pulse Heart Rate Resp SpO2 Height Weight   10/22/19 0720 137/86 97  F (36.1  C) -- 95 -- 16 94 % -- --   10/22/19 0448 -- -- -- -- 93 11 95 % -- --   10/22/19 0430 -- 97.9  F (36.6  C) Oral -- 95 16 90 % -- --   10/22/19 0400 -- -- -- -- -- -- 95 % -- --   10/22/19 0330 (!) 158/87 -- -- 100 115 -- 93 % -- --   10/22/19 0230 (!) 156/98 -- -- 108 106 15 93 % -- --   10/22/19 0200 -- -- -- -- 109 8 91 % -- --   10/22/19 0130 (!) 148/92 -- -- 106 110 17 94 % -- --   10/22/19 0115 -- -- -- -- 117 19 94 % -- --   10/22/19 0100 (!) 140/90 -- -- 110 106 18 92 % -- --   10/22/19 0045 -- -- -- -- 120 -- 90 % -- --   10/22/19 0035 (!) 153/94 -- -- 109 111 14 93 % -- --   10/22/19 0030 (!) 143/93 98.4  F (36.9  C) Oral 103 112 17 93 % -- --    10/22/19 0000 (!) 141/99 100.2  F (37.9  C) Oral 119 121 13 93 % -- --   10/21/19 2345 (!) 139/91 -- -- 113 114 11 94 % -- --   10/21/19 2330 (!) 140/95 99.2  F (37.3  C) -- 111 110 16 95 % -- --   10/21/19 2315 (!) 135/94 -- -- 112 112 17 93 % -- --   10/21/19 2300 (!) 160/103 99.8  F (37.7  C) -- 113 111 18 93 % -- --   10/21/19 2245 (!) 156/103 -- -- 111 113 12 93 % -- --   10/21/19 2230 (!) 157/106 -- -- 118 118 21 94 % -- --   10/21/19 2215 (!) 158/101 -- -- 115 118 18 94 % -- --   10/21/19 2207 (!) 164/104 99.1  F (37.3  C) Oral 120 137 13 92 % -- --   10/21/19 2200 (!) 170/110 -- -- 117 116 16 98 % -- --   10/21/19 2150 (!) 174/111 -- -- 117 119 16 98 % -- --   10/21/19 2144 (!) 158/108 100  F (37.8  C) Axillary -- -- 20 99 % -- --   10/21/19 1738 (!) 142/97 99.1  F (37.3  C) Oral -- 121 20 97 % 1.829 m (6') 108 kg (238 lb)     Ranges for his vital signs:  Temp:  [97  F (36.1  C)-100.2  F (37.9  C)] 97  F (36.1  C)  Pulse:  [] 95  Heart Rate:  [] 93  Resp:  [8-21] 16  BP: (135-174)/() 137/86  SpO2:  [90 %-99 %] 94 %    Physical Examination:  GENERAL:  well-developed, well-nourished, in bed in no acute distress.   HEENT:  Head is normocephalic, atraumatic   EYES:  Eyes have anicteric sclerae without conjunctival injection or stigmata of endocarditis.    ENT:  Oropharynx is moist without exudates or ulcers. Tongue is midline  NECK:  Supple. No  Cervical lymphadenopathy  LUNGS:  Clear to auscultation bilateral.   CARDIOVASCULAR:  Regular rate and rhythm with no murmurs, gallops or rubs.  ABDOMEN:  Normal bowel sounds, soft, nontender. No appreciable hepatosplenomegaly  SKIN: left leg - wrapped   NEUROLOGIC:  Grossly nonfocal. Active x4 extremities         Laboratory Data:     Inflammatory Markers    Recent Labs   Lab Test 10/22/19  0102   SED 70*   .0*     Hematology Studies    Recent Labs   Lab Test 10/22/19  0534   WBC 10.1   ANEU 8.1   AEOS 0.0   HGB 11.9*   MCV 96   *      Metabolic Studies     Recent Labs   Lab Test 10/22/19  0534      POTASSIUM 4.6   CHLORIDE 99   CO2 28   BUN 16   CR 0.88   GFRESTIMATED >90     Hepatic Studies    Recent Labs   Lab Test 10/22/19  0534   BILITOTAL 0.2   ALKPHOS 80   ALBUMIN 2.3*   AST 14   ALT 18     Microbiology:  Culture Micro   Date Value Ref Range Status   10/21/2019 Culture negative monitoring continues  Preliminary   10/21/2019 Culture negative monitoring continues  Preliminary

## 2019-10-22 NOTE — CONSULTS
[facility], [place]    Internal Medicine Consult        Date of Admission: 10/21/2019  Consult Requested by: Bryan Spain MD  Reason for Consult: Left Septic Knee    Assessment & Plan   Alexis Patiño is a 54 year old man with a history of obesity, untreated TANJA, tobacco use, hypertriglyceridemia, sclerosing mesenteritis, GERD, and depression who underwent inside out repair of lateral meniscus w/ Dr. Spain on 10/1/19. He developed pain and swelling concerning for septic joint and is now admitted s/p left knee arthroscopic I&D on 10/21/19.     #Left Native Knee Septic Joint.   #Recent Lateral Meniscus Repair.   POD #1 from arthroscopic I&D. 77796 WBC, 97% neutrophils. Gram stain w/ many WBCs - predominantly PMNs; culture pending. Afebrile. No leukocytosis. . Plt count reactive - 465K. Reasonable pain control post op.   - Ortho is primary.   - ID is consulted. Continue cefazolin day #2 and awaiting culture data. May end up needing PICC and OP IV abx. Wife is a RN.    - CBC and CRP in AM.   -  mg daily x 2 wks post op for DVT proph per ortho.   - Pain control w/ scheduled Tylenol and PRN oxycodone.     #TANJA. Non compliant w/ CPAP. Required overnight O2.   - O2 spot checks/PRN O2 via NC at night.     #Tobacco Use Disorder.   #Alcohol Use.   Ongoing tobacco use w/ desire to quit but has not yet been successful. Is not being forthcoming about his home alcohol use but there is concern for overuse based on our discussion.   - Tobacco cessation. Wearing patch here.   - Closely monitor for s/s ETOH w/d. Low threshold to order MSSA scoring.     #GERD. Stable.   - Continue home PPI.     #MDD. Stable.   - Takes HS Seroquel.     IM will follow.   The patient's care was discussed with ortho NP Bri and medicine attending Dr. Schwarz.     Elen Giraldo PA-C  Hospitalist Service  [facility]  Pager:  [phone]  ______________________________________________________________________    Chief Complaint   Left Septic Knee     History is obtained from the patient    History of Present Illness   Patient underwent arthroscopic lateral meniscus repair on 10/1/19. He was initially doing ok but then developed progressive left knee pain, swelling, inability to sleep, and decreased ROM. He was very frustrated with his pain control and ability to get enough pain medications. He was seen in the [place] ED and had a negative LLE US for DVT. He was then seen at [facility] and had an aspiration of the knee which showed purulent fluid w/ > 100K WBCs and significant % PMNs. He was then admitted here for I&D which he underwent last night without complication. Today his pain is better. He was able to ambulate with crutches. No f/c. No recent BM but passing flatus and no abdominal pain or N/V. No CP or SOB. Knows he should quit smoking but wants a pill that he can still smoke on while trying to quit. He doesn't want to take anything that he doesn't trust. In regards to alcohol, he made a comment that he could simply drink a couple beers to have a BM. He would not answer how much he drinks in a day stating his PCP had asked the same if he shops at the liquor store for a day, week, or month but he doesn't think that's anyone's business. He denies past or current withdrawal symptoms.     Review of Systems   10 point ROS performed and negative unless otherwise noted in HPI     Past Medical History    I have reviewed this patient's medical history and updated it with pertinent information if needed.   Past Medical History:   Diagnosis Date     GERD (gastroesophageal reflux disease)      Hypertriglyceridemia      Major depressive disorder      TANJA (obstructive sleep apnea)      Sclerosing mesenteritis (H)         Past Surgical History   I have reviewed this patient's surgical history and updated it with pertinent information if needed.  Past  Surgical History:   Procedure Laterality Date     BACK SURGERY  1996    L4-S1         Social History   Social History     Tobacco Use     Smoking status: Current Every Day Smoker   Substance Use Topics     Alcohol use: Yes     Drug use: None     Lives at home with wife.     Family History   I have reviewed this patient's family history and updated it with pertinent information if needed.   Family History   Problem Relation Age of Onset     Unknown/Adopted Mother      Unknown/Adopted Father      Medications   Medications Prior to Admission   Medication Sig Dispense Refill Last Dose     acetaminophen (TYLENOL) 500 MG tablet Take 1,000 mg by mouth 3 times daily as needed for mild pain   10/21/2019 at 1800     omeprazole (PRILOSEC) 20 MG DR capsule Take 20 mg by mouth daily   10/20/2019 at 0800     QUEtiapine (SEROQUEL) 25 MG tablet Take 25 mg by mouth At Bedtime   10/20/2019 at 2200       Allergies   Allergies   Allergen Reactions     Lexapro [Escitalopram] Angioedema       Physical Exam   /86   Pulse 95   Temp 97  F (36.1  C)   Resp 16   Ht 1.829 m (6')   Wt 108 kg (238 lb)   SpO2 94%   BMI 32.28 kg/m     GENERAL: Alert and oriented x 3. Lying in bed, appears comfortable. Conversant.   HEENT: Anicteric sclera. Mucous membranes moist.   CV: RRR. S1, S2. No murmurs appreciated.   RESPIRATORY: Effort normal on room air. Lungs CTAB with no wheezing, rales, rhonchi.   GI: Abdomen soft and non distended, bowel sounds present. No tenderness, rebound, guarding.   MUSCULOSKELETAL: Left knee in brace.   NEUROLOGICAL: No focal deficits. Moves all extremities.   EXTREMITIES: No peripheral edema. Intact bilateral pedal pulses.   SKIN: No jaundice. No rashes.     Data   Data reviewed today: I reviewed all medications, new labs and imaging results over the last 24 hours.

## 2019-10-22 NOTE — OR NURSING
PACU to Inpatient Nursing Handoff    Patient Alexis Patiño is a 54 year old male who speaks English.   Procedure Procedure(s):  IRRIGATION AND DEBRIDEMENT, KNEE, ARTHROSCOPIC   Surgeon(s) Primary: Bryan Spain MD  Resident - Assisting: Matt Zurita MD     Allergies   Allergen Reactions     Lexapro [Escitalopram] Swelling       Isolation  [unfilled]     Past Medical History   has no past medical history on file.    Anesthesia General   Dermatome Level     Preop Meds acetaminophen (Tylenol) - time given: 1853  fentanyl 100 mcg total in preop  - time given: 1858   Nerve block Not applicable   Intraop Meds dexamethasone (Decadron)  fentanyl (Sublimaze): 200 mcg total  ondansetron (Zofran): last given at 2137   Local Meds Yes   Antibiotics cefazolin (Ancef) - last given at 2106     Pain Patient Currently in Pain: yes  Comfort: tolerable with discomfort  Pain Control: partially effective   PACU meds  fentanyl (Sublimaze): 75 mcg (total dose) last given at 2212   hydromorphone (Dilaudid): 0.5 mg (total dose) last given at 2245    PCA / epidural No   Capnography Respiratory Monitoring (EtCO2): 36 mmHg   Telemetry ECG Rhythm: Sinus tachycardia   Inpatient Telemetry Monitor Ordered? No        Labs Glucose No results found for: GLC    Hgb No results found for: HGB    INR No results found for: INR   PACU Imaging Not applicable     Wound/Incision Incision/Surgical Site 10/21/19 Left;Lateral Knee (Active)   Incision Assessment [facility] 10/21/2019 10:53 PM   Charito-Incision Assessment [facility] 10/21/2019 10:53 PM   Closure Sutures 10/21/2019  9:07 PM   Dressing Intervention Clean, dry, intact 10/21/2019 10:53 PM   Number of days: 0       Incision/Surgical Site 10/21/19 Left;Medial Knee (Active)   Incision Assessment [facility] 10/21/2019 10:53 PM   Charito-Incision Assessment [facility] 10/21/2019 10:53 PM   Closure Sutures 10/21/2019  9:07 PM   Dressing Intervention Clean, dry, intact 10/21/2019 10:53 PM   Number of days: 0        Incision/Surgical Site 10/21/19 Left;Medial;Upper Knee (Active)   Incision Assessment UTV 10/21/2019 10:53 PM   Charito-Incision Assessment UTV 10/21/2019 10:53 PM   Closure Sutures 10/21/2019  9:17 PM   Dressing Intervention Clean, dry, intact 10/21/2019 10:53 PM   Number of days: 0      CMS        Equipment ice pack and knee immobilizer   Other LDA       IV Access Peripheral IV 10/21/19 Left Lower forearm (Active)   Site Assessment WDL 10/21/2019 10:53 PM   Line Status Infusing 10/21/2019 10:53 PM   Phlebitis Scale 0-->no symptoms 10/21/2019 10:53 PM   Infiltration Scale 0 10/21/2019 10:53 PM   Dressing Intervention New dressing  10/21/2019  8:06 PM   IV Site Rotation Due Date 10/24/19 10/21/2019  8:06 PM   Reason Not Rotated Anticipated discharge 10/21/2019  8:06 PM   Number of days: 0      Blood Products Not applicable EBL 5 mL   Intake/Output Date 10/21/19 0700 - 10/22/19 0659   Shift 6646-9017 1341-8631 1458-5855 24 Hour Total   INTAKE   P.O.  155  155   I.V.  400  400   Shift Total(mL/kg)  555(5.14)  555(5.14)   OUTPUT   Blood  5  5   Shift Total(mL/kg)  5(0.05)  5(0.05)   Weight (kg)  107.95 107.95 107.95      Drains / Caraballo     Time of void PreOp      PostOp      Diapered? No   Bladder Scan      mL(Jello) (10/21/19 2245)  tolerating sips     Vitals    B/P: (!) 157/106  T: 99.1  F (37.3  C)    Temp src: Oral  P:  Pulse: 118 (10/21/19 2230)    Heart Rate: 118 (10/21/19 2230)     R: 21  O2:  SpO2: 94 %    O2 Device: Nasal cannula (10/21/19 2207)    Oxygen Delivery: 2 LPM (10/21/19 2207)         Family/support present significant other   Patient belongings     Patient transported on cart and air mat   DC meds/scripts (obs/outpt) Not applicable   Inpatient Pain Meds Released? Yes       Special needs/considerations pt is a smoker, has sleep apnea and dosen't use CPAP at home   Tasks needing completion None       Katlyn Duke, RN  [facility]COM [id number]

## 2019-10-22 NOTE — H&P
[facility], Orthopaedic Surgery H&P    Alexis Patiño MRN# [id number]   Age: 54 year old YOB: 1965     Reason for admission: Possible septic knee, left                 Assessment and Plan:   Assessment:  54y M recently s/p left knee arthroscopy and inside out repair left lateral mensicus with Dr. Spain on 10/1/19 now presenting with knee swelling and fluid concerning for postoperative septic arthritis of the left knee.    Plan:  OR tonight with Dr. Spain for arthroscopic I&D left knee  -consented  -npo  -orders placed     Maintain meniscus repair protocol post op  Admit to ortho with ID consultation   F/u cultures    Staff: Dr. Spain.     Matt Zurita MD 10/21/2019  Orthopaedic Surgery Resident  Pager: [phone]            History of Present Illness:   Patient was seen and examined by me. History, PMH, Meds, SH, complete ROS (10 organ systems) and PE reviewed with patient and prior medical records.      In brief, Alexis is a 54yM with a h/o heavy tobacco use and obesity who presents with progressive left knee pain and swelling s/p recent arthroscopy with inside out repair of his lateral meniscus on 10/1 with Dr. Spain. He had been doing quite well up until the last couple of days when he reportedly had increased pain and difficulty sleeping. He was also reported to have difficulty lifting his leg and undergoing full ROM. He presented to an ER in [place] where a DVT U/S was conducted and found to be negative. CRP was 32, WBC 10.7, peak temp 100.6. He was then seen by Dr. Spain's PA Jw Paz who proceeded with a knee aspiration in clinic, demonstrating purulent/cloudy fluid with >100,000 WBCs and significant %PMNs. Dr. Spain thus elected to proceed with debridement tonight at [facility].    Plan for admission, infectious disease consultation and abx    DATE OF SURGERY: 10/01/2019.  SURGERY: left knee arthroscopy and inside-out lateral meniscus repair              Past  Medical History:   Tobacco use  obesity           Social History:   Reported heavy alcohol use, several beers a week  Every day smoker           Family History:   History reviewed. No pertinent family history.           Medications:     Current Facility-Administered Medications   Medication     lactated ringers infusion     PRE OP antibiotics NOT needed for this surgical procedure.     sodium chloride (PF) 0.9% PF flush 3 mL     sodium chloride (PF) 0.9% PF flush 3 mL     Facility-Administered Medications Ordered in Other Encounters   Medication     dexamethasone (DECADRON) injection     fentaNYL (PF) (SUBLIMAZE) injection     lidocaine 2% injection (MDV)     midazolam (VERSED) injection     propofol (DIPRIVAN) injection 10 mg/mL vial             Allergies:      Allergies   Allergen Reactions     Lexapro [Escitalopram] Swelling            Review of Systems:   A comprehensive 10 point review of systems (constitutional, ENT, cardiac, peripheral vascular, respiratory, GI, , Musculoskeletal, skin, Neurological) was performed and found to be negative except as described in this note.           Physical Exam:   COMPLETE EXAMINATION:   VITAL SIGNS: BP (!) 142/97   Temp 99.1  F (37.3  C) (Oral)   Resp 20   Ht 1.829 m (6')   Wt 108 kg (238 lb)   SpO2 97%   BMI 32.28 kg/m    RESP: Non labored breathing  SKIN: Grossly normal   MUSCULOSKELETAL:   No deformity  Swollen, effused knee with warmth to touch  No drainage  ROM limited by pain

## 2019-10-22 NOTE — OR NURSING
Transfer of care to Karla NIETO at 0015. Pt transferred to the floor safely. Pt had an ice pack on L knee that was removed on arrival to the floor. Pt's temp 100.2 F prior to departing to the floor. Karla NIETO is aware and will continue to monitor Pt's VS.

## 2019-10-22 NOTE — PLAN OF CARE
Discharge Planner OT   Patient plan for discharge: Unknown  Current status: Per PT, patient is close to baseline and has had this surgery prior.  Patient does not have inpatient OT needs.    Barriers to return to prior living situation: Defer to PT  Recommendations for discharge: Defer to PT  Rationale for recommendations: No inpatient or home OT needs; orders completed.       Entered by: Celena Bennett 10/22/2019 9:19 AM

## 2019-10-22 NOTE — PLAN OF CARE
Discharge Planner PT   Patient plan for discharge: home  Current status: PT eval completed, treatment initiated. Pt on RA, VSS and asymptomatic. Pain reported to be well controlled. Educated on weightbearing precautions and KI, pt with good understanding. Is IND w/ bed mobility, sit<>stands, mod I w/ both FWW and crutches (prefers crutches for home), ~250' w/ no fatigue noted. Performs stairs per home set up mod I. No concerns for home discharge.   Barriers to return to prior living situation: medical  Recommendations for discharge: home w/ assist, OP PT  Rationale for recommendations: Pt is mobilizing well enough to discharge home, recommend resumption of OP PT to advance post-op protocol. No DME recommendations.     Physical Therapy Discharge Summary    Reason for therapy discharge:    Discharged to home with outpatient therapy.    Progress towards therapy goal(s). See goals on Care Plan in [facility] electronic health record for goal details.  Goals met    Therapy recommendation(s):    Continued therapy is recommended.  Rationale/Recommendations:  OP PT to progress post-op protocol.           Entered by: Koki Lopez 10/22/2019 9:22 AM

## 2019-10-22 NOTE — PROGRESS NOTES
10/22/19 0896   Quick Adds   Type of Visit Initial PT Evaluation       Present no   Living Environment   Lives With spouse   Living Arrangements house   Home Accessibility stairs to enter home   Number of Stairs, Main Entrance 2   Stair Railings, Main Entrance none   Transportation Anticipated family or friend will provide   Living Environment Comment PT: Pt lives in [place] with wife, wife has one week off post this surgery to help, then goes back to work.    Self-Care   Usual Activity Tolerance good   Current Activity Tolerance moderate   Regular Exercise Other (see comments)   Equipment Currently Used at Home crutches;shower chair   Activity/Exercise/Self-Care Comment PT: post first surgery 3 weeks ago has been using shower chair and weaned off crutches prior to onset of pain again d/t infection. Reports no issues with home set up management.    Functional Level Prior   Ambulation 0-->independent   Transferring 0-->independent   Toileting 0-->independent   Bathing 0-->independent   Communication 0-->understands/communicates without difficulty   Swallowing 0-->swallows foods/liquids without difficulty   Cognition 0 - no cognition issues reported   Fall history within last six months no   Which of the above functional risks had a recent onset or change? ambulation;transferring   Prior Functional Level Comment PT: IND at baseline, has been using crutches post initial surgery   General Information   Onset of Illness/Injury or Date of Surgery - Date 10/21/19   Referring Physician Bryan Spain MD   Patient/Family Goals Statement PT: To improve pain and get back to healing   Pertinent History of Current Problem (include personal factors and/or comorbidities that impact the POC) Alexis Patiño is a 54 year old male with a presumed septic arthritis of the left knee s/p arthroscopic I&D on 10/21 with Dr. Spain. Patient is now nearly 3 weeks s/p left knee arthroscopy, inside out repair  lateral meniscus with Dr. Spain on 10/1/19.    Weight-Bearing Status - LLE weight-bearing as tolerated  (KI in extension)   General Observations PT: Pt in supine, agreeable to PT eval   General Info Comments PT: Activity orders: Up w/ assist. KI in extension when OOB, transition to hinged knee brace POD ~2. 0-90 L knee ROM in supine w/ therapy and at rest in chair   Cognitive Status Examination   Orientation orientation to person, place and time   Level of Consciousness alert   Follows Commands and Answers Questions 100% of the time   Personal Safety and Judgment intact   Memory intact   Cognitive Comment PT: no cognitive concerns   Pain Assessment   Patient Currently in Pain Yes, see Vital Sign flowsheet  (tolerable in L knee)   Integumentary/Edema   Integumentary/Edema Comments ace wrapped LLE, KI donned   Posture    Posture Not impaired   Range of Motion (ROM)   ROM Comment PT: 0-25 L knee AAROM, WFL RLE   Strength   Strength Comments WFL RLE, able to perform SLR in supine LLE w/ KI donned   Bed Mobility   Bed Mobility Comments IND HOB flat   Transfer Skills   Transfer Comments sit<>stands IND   Gait   Gait Comments mod I w/ FWW for 75', 250' mod I w/ crutches   Balance   Balance Comments good static and dynamic balance   Sensory Examination   Sensory Perception Comments reports decreased sensation in L heel, light touch intact in L foot/ big toe besides heel   Coordination   Coordination Comments WFL   Muscle Tone   Muscle Tone Comments WFL   Modality Interventions   Planned Modality Interventions Comments none   General Therapy Interventions   Planned Therapy Interventions gait training;ROM;transfer training;risk factor education;home program guidelines;progressive activity/exercise   Clinical Impression   Criteria for Skilled Therapeutic Intervention yes, treatment indicated   PT Diagnosis impaired functional mobility   Influenced by the following impairments pain, post-op precautions, decreased activity  "tolerance   Functional limitations due to impairments decreased (I) w/ gait, decreased access to home environment and ADLs   Clinical Presentation Stable/Uncomplicated   Clinical Presentation Rationale current status, clinical judgement   Clinical Decision Making (Complexity) Low complexity   Therapy Frequency Daily   Predicted Duration of Therapy Intervention (days/wks) 1 day   Anticipated Equipment Needs at Discharge   (none)   Anticipated Discharge Disposition Home with Assist   Risk & Benefits of therapy have been explained Yes   Patient, Family & other staff in agreement with plan of care Yes   Clinical Impression Comments see care plan   [facility] TM \"6 Clicks\"   2016, Trustees of [facility], under license to [employer].  All rights reserved.   6 Clicks Short Forms Basic Mobility Inpatient Short Form   [facility]  \"6 Clicks\" V.2 Basic Mobility Inpatient Short Form   1. Turning from your back to your side while in a flat bed without using bedrails? 4 - None   2. Moving from lying on your back to sitting on the side of a flat bed without using bedrails? 4 - None   3. Moving to and from a bed to a chair (including a wheelchair)? 4 - None   4. Standing up from a chair using your arms (e.g., wheelchair, or bedside chair)? 4 - None   5. To walk in hospital room? 4 - None   6. Climbing 3-5 steps with a railing? 4 - None   Basic Mobility Raw Score (Score out of 24.Lower scores equate to lower levels of function) 24   Total Evaluation Time   Total Evaluation Time (Minutes) 5     "

## 2019-10-22 NOTE — PROGRESS NOTES
Orthopaedic Surgery Progress Note:       Subjective:   NAEO. Patient reports doing well. Pain well controlled on current regimen. Has been up to bedside without issue. Tolerating KI well. No fevers overnight. Tolerating diet and voiding without issue    Objective:   Temp:  [97  F (36.1  C)-100.2  F (37.9  C)] 97  F (36.1  C)  Pulse:  [] 95  Heart Rate:  [] 93  Resp:  [8-21] 16  BP: (135-174)/() 137/86  SpO2:  [90 %-99 %] 94 %    Intake/Output Summary (Last 24 hours) at 10/22/2019 0832  Last data filed at 10/22/2019 0800  Gross per 24 hour   Intake 1082 ml   Output 2480 ml   Net -1398 ml       Gen: NAD. Resting comfortably in bed  Resp: Breathing comfortably on RA  LLE:  Dressing/ACE is c/d/i. KI in place  Incisions not visualized directly today  SILT in femoral, saphenous, sural, deep peroneal, superficial peroneal, and tibial n dist.   Fires EHL/FHL/TA/Gastroc with 5/5 strength    PT and DP pulses intact, 2+ and foot is wwp    Labs:  Recent Labs   Lab 10/22/19  0534 10/22/19  0102   WBC 10.1  --    HGB 11.9*  --    *  --    CRP  --  270.0*       All cultures:  Recent Labs   Lab 10/21/19  2100   CULT PENDING  PENDING          Assessment & Plan:   Assessment/Plan: Alexis Patiño is a 54 year old male with a presumed septic arthritis of the left knee s/p arthroscopic I&D on 10/21 with Dr. Spain. Patient is now nearly 3 weeks s/p left knee arthroscopy, inside out repair lateral meniscus with Dr. Spain on 10/1/19.      Activity: Up with assist and assistive devices as needed  Weight bearing status: WBAT when in full extension  Brace: KI overnight in extension to quiet joint down, then meniscus repair protocol                  -transition to hinged knee brace in 1-2 days                  -then, WBAT locked in extension when mobilizing                  -0-90 ROM when working with therapy and at rest/in chair  Antibiotics: Ancef continuous; appreciate ID recommendations                  Tailor to  cultures                   PICC likely to be needed pending recommendations  Diet: Begin with clear fluids and progress diet as tolerated. Bowel regimen. Anti-emetics PRN.   DVT prophylaxis: mechanical and ASA while inpatient,  discharge on ASA 325mg daily x 2 weeks.  Elevation: Elevate heels off of bed on pillows. No pillows behind the knee at any time.   Wound Care: steristrips and primapor x3 to 3 portal sites; ACE; reinforce/change as needed  Drains: none  Pain management: transition from IV to orals as tolerated.    Resume appropriate home meds; nicotine patch PRN  Physical Therapy:ROM, ADL's.   Labs: CBC, BMP, ESR, CRP  Cultures: Pending, follow culture results closely ; clinical aspiration and intraop fluid cultures  Consults: PT, OT. Hospitalist , Infectious Disease; appreciate assistance in caring for this patient.   Follow-up: Clinic in 2 weeks for wound check with Dr. Spain or his PA Jw Paz PA-C     Disposition: Pending infectious disease recommendations and stability, anticipate discharge to Home on POD 2-3      Matt Zurita MD 10/22/2019  Orthopaedic Surgery Resident  Pager: [phone]

## 2019-10-22 NOTE — PHARMACY-VANCOMYCIN DOSING SERVICE
Pharmacy Vancomycin Initial Note  Date of Service 2019  Patient's  1965  54 year old, male    Indication: Bone and Joint Infection    Current estimated CrCl = Estimated Creatinine Clearance: 120.5 mL/min (based on SCr of 0.88 mg/dL).    Creatinine for last 3 days  10/22/2019:  5:34 AM Creatinine 0.88 mg/dL    Recent Vancomycin Level(s) for last 3 days  No results found for requested labs within last 72 hours.      Vancomycin IV Administrations (past 72 hours)      No vancomycin orders with administrations in past 72 hours.                Nephrotoxins and other renal medications (From now, onward)    Start     Dose/Rate Route Frequency Ordered Stop    10/22/19 1700  vancomycin (VANCOCIN) 2,000 mg in sodium chloride 0.9 % 500 mL intermittent infusion      2,000 mg  over 2 Hours Intravenous EVERY 12 HOURS 10/22/19 1613            Contrast Orders - past 72 hours (72h ago, onward)    None                Plan:  1.  Start vancomycin  2000 mg IV q12h.   2.  Goal Trough Level: 15-20 mg/L   3.  Pharmacy will check trough levels as appropriate in 1-3 Days.    4. Serum creatinine levels will be ordered daily for the first week of therapy and at least twice weekly for subsequent weeks.    5. Union method utilized to dose vancomycin therapy: Method 2    Angelica Cartwright [facility]

## 2019-10-22 NOTE — PLAN OF CARE
VS: Hypertensive and tachycardic, elevated temp in PACU but stable on unit, scheduled tylenol given.   O2: 2-3L via NC, capno on, pt has sleep apnea but does not use CPAP, desats when asleep.   Output: Voiding using urinal, retaining, last .   Last BM: 10/20/19, passing flatus.   Activity: WBAT, pt has not been up yet, pt arrived to unit after midnight and wanted to wait until later this morning to get up.   Skin: Incision, abrasion.   Pain: Aching left knee managed with oxycodone.   CMS: Numbness in left heel.   Dressing: CDI.   Diet: Regular.   LDA: PIV left lower forearm SL this am.   Equipment: IV pole, knee immobilizer, capno, foot pumps, IS, walker, gait belt, call light within reach.   Plan: Continue to monitor.   Additional Info:

## 2019-10-23 ENCOUNTER — HOME INFUSION (PRE-WILLOW HOME INFUSION) (OUTPATIENT)
Dept: PHARMACY | Facility: CLINIC | Age: 54
End: 2019-10-23

## 2019-10-23 LAB
CREAT SERPL-MCNC: 0.83 MG/DL (ref 0.66–1.25)
CRP SERPL-MCNC: 140 MG/L (ref 0–8)
ERYTHROCYTE [DISTWIDTH] IN BLOOD BY AUTOMATED COUNT: 12 % (ref 10–15)
GFR SERPL CREATININE-BSD FRML MDRD: >90 ML/MIN/{1.73_M2}
HCT VFR BLD AUTO: 35.9 % (ref 40–53)
HGB BLD-MCNC: 11.6 G/DL (ref 13.3–17.7)
MCH RBC QN AUTO: 31.5 PG (ref 26.5–33)
MCHC RBC AUTO-ENTMCNC: 32.3 G/DL (ref 31.5–36.5)
MCV RBC AUTO: 98 FL (ref 78–100)
PLATELET # BLD AUTO: 500 10E9/L (ref 150–450)
RBC # BLD AUTO: 3.68 10E12/L (ref 4.4–5.9)
WBC # BLD AUTO: 9.9 10E9/L (ref 4–11)

## 2019-10-23 PROCEDURE — 12000001 ZZH R&B MED SURG/OB UMMC

## 2019-10-23 PROCEDURE — 25000132 ZZH RX MED GY IP 250 OP 250 PS 637: Performed by: STUDENT IN AN ORGANIZED HEALTH CARE EDUCATION/TRAINING PROGRAM

## 2019-10-23 PROCEDURE — 99232 SBSQ HOSP IP/OBS MODERATE 35: CPT | Performed by: PHYSICIAN ASSISTANT

## 2019-10-23 PROCEDURE — 25000132 ZZH RX MED GY IP 250 OP 250 PS 637: Performed by: ORTHOPAEDIC SURGERY

## 2019-10-23 PROCEDURE — 82565 ASSAY OF CREATININE: CPT | Performed by: PHYSICIAN ASSISTANT

## 2019-10-23 PROCEDURE — 85027 COMPLETE CBC AUTOMATED: CPT | Performed by: PHYSICIAN ASSISTANT

## 2019-10-23 PROCEDURE — 36415 COLL VENOUS BLD VENIPUNCTURE: CPT | Performed by: PHYSICIAN ASSISTANT

## 2019-10-23 PROCEDURE — 25800030 ZZH RX IP 258 OP 636: Performed by: ORTHOPAEDIC SURGERY

## 2019-10-23 PROCEDURE — 25000128 H RX IP 250 OP 636: Performed by: ORTHOPAEDIC SURGERY

## 2019-10-23 PROCEDURE — 86140 C-REACTIVE PROTEIN: CPT | Performed by: PHYSICIAN ASSISTANT

## 2019-10-23 RX ORDER — POLYETHYLENE GLYCOL 3350 17 G/17G
17 POWDER, FOR SOLUTION ORAL DAILY
Status: DISCONTINUED | OUTPATIENT
Start: 2019-10-23 | End: 2019-10-24 | Stop reason: HOSPADM

## 2019-10-23 RX ORDER — HEPARIN SODIUM,PORCINE 10 UNIT/ML
2-5 VIAL (ML) INTRAVENOUS
Status: DISCONTINUED | OUTPATIENT
Start: 2019-10-23 | End: 2019-10-24 | Stop reason: HOSPADM

## 2019-10-23 RX ORDER — LIDOCAINE 40 MG/G
CREAM TOPICAL
Status: DISCONTINUED | OUTPATIENT
Start: 2019-10-23 | End: 2019-10-24 | Stop reason: HOSPADM

## 2019-10-23 RX ADMIN — OXYCODONE HYDROCHLORIDE 10 MG: 5 TABLET ORAL at 21:07

## 2019-10-23 RX ADMIN — QUETIAPINE FUMARATE 25 MG: 25 TABLET ORAL at 21:08

## 2019-10-23 RX ADMIN — OXYCODONE HYDROCHLORIDE 10 MG: 5 TABLET ORAL at 12:42

## 2019-10-23 RX ADMIN — OMEPRAZOLE 20 MG: 20 CAPSULE, DELAYED RELEASE ORAL at 07:49

## 2019-10-23 RX ADMIN — OXYCODONE HYDROCHLORIDE 10 MG: 5 TABLET ORAL at 09:03

## 2019-10-23 RX ADMIN — MELATONIN TAB 3 MG 3 MG: 3 TAB at 21:08

## 2019-10-23 RX ADMIN — OXYCODONE HYDROCHLORIDE 5 MG: 5 TABLET ORAL at 06:06

## 2019-10-23 RX ADMIN — ASPIRIN 325 MG: 325 TABLET, DELAYED RELEASE ORAL at 07:49

## 2019-10-23 RX ADMIN — NICOTINE 1 PATCH: 14 PATCH, EXTENDED RELEASE TRANSDERMAL at 07:49

## 2019-10-23 RX ADMIN — ACETAMINOPHEN 975 MG: 325 TABLET, FILM COATED ORAL at 07:49

## 2019-10-23 RX ADMIN — OXYCODONE HYDROCHLORIDE 10 MG: 5 TABLET ORAL at 02:58

## 2019-10-23 RX ADMIN — OXYCODONE HYDROCHLORIDE 5 MG: 5 TABLET ORAL at 06:00

## 2019-10-23 RX ADMIN — ACETAMINOPHEN 975 MG: 325 TABLET, FILM COATED ORAL at 16:18

## 2019-10-23 RX ADMIN — VANCOMYCIN HYDROCHLORIDE 2000 MG: 10 INJECTION, POWDER, LYOPHILIZED, FOR SOLUTION INTRAVENOUS at 05:38

## 2019-10-23 RX ADMIN — OXYCODONE HYDROCHLORIDE 10 MG: 5 TABLET ORAL at 17:50

## 2019-10-23 RX ADMIN — VANCOMYCIN HYDROCHLORIDE 2000 MG: 10 INJECTION, POWDER, LYOPHILIZED, FOR SOLUTION INTRAVENOUS at 16:19

## 2019-10-23 ASSESSMENT — ACTIVITIES OF DAILY LIVING (ADL)
ADLS_ACUITY_SCORE: 12

## 2019-10-23 NOTE — PROGRESS NOTES
S: order received to see patient at [facility] for a post-op style ROM knee brace as ordered by   O/G: support and stabilize the left knee and prevent unwanted motion  A: patient seen for fitting/delivery of post-op hinged style ROM knee brace.  Joint was locked in full extension.  Straps were trimmed to correct circumferences and upright lengths were adjusted to customize the fit of the knee brace to this patient. Patient instructed on donning, doffing, use and care.  Patient provided with instruction booklet that came with the brace.   P: contact orthotics if any issues arise.  TAQUERIA Winston.     Padmini T-Scope Knee Brace Directions

## 2019-10-23 NOTE — PROGRESS NOTES
GENERAL INFECTIOUS DISEASES PROGRESS NOTE     Patient:  Alexis Patiño   Date of birth 1965, Medical record number [id number]  Date of Visit:  10/23/2019  Date of Admission: 10/21/2019  Consult Requested by:Bryan Spain MD  Reason for Consult:  Septic arthritis           Assessment and Recommendations:     Alexis Patiño is a 53 yo male with history of heavy tobacco use, obesity, admitted on 10/21/19 for worsening left knee pain and swelling, fevers and sweating in the past 10 days s/p recent arthroscopy with inside out repair of his lateral meniscus on 10/1/19 with Dr Spain. ( first 12 days post surgery, hgkojo was doing well, tolerating PT and weight bearing) He went to ER and US was negative for DVT.  CRP> 32, WBC 10.7.  He later went to see Dr Spain's PA and a knee aspiration was done. fluid was purulent/ cloudy with >100,000 WBCs with PMN predominance.     1. Left septic knee post arthroscopy with inside out repair of his lateral meniscus on 10/1/19  - fluid analysis cloudy, yellow WBC 68274 Neut 97% Gram stain - many WBCs and predom PMNs, Cultures : Staph sp.   - left knee arthroscopic debridement and irrigation on 10/21/19.      RECOMMENDATION:  - follow-up on cultures ( also from outside lab)  - continue Vancomycin IV, adjust per culture/ susceptibility   - PICC line     Plan discussed with primary team . ID will follow     Thank you for allowing us to participate in the care of this patient    Kale Nickerson MD, M.Med.Sc  Staff, Infectious Diseases   Pager : [phone]    Interval History   feels better. no fever, chills. left knee pain is controlled . left ankle edema . tolerates antibiotics         History of Present Illness:     10/22/19  Alexis Patiño is a 53 yo male with history of heavy tobacco use, obesity, admitted on 10/21/19 for worsening left knee pain and swelling, fevers and sweating in the past 10 days s/p recent arthroscopy with inside out repair of his lateral  meniscus on 10/1/19 with Dr Spain. ( first 12 days post surgery, kobi was doing well, tolerating PT and weight bearing) He went to ER and US was negative for DVT.  CRP> 32, WBC 10.7.  He later went to see Dr Spain's PA and a knee aspiration was done. fluid was purulent/ cloudy with >100,000 WBCs with PMN predominance.     He had left knee arthroscopic debridement and irrigation on 10/21/19.    OPERATIVE FINDINGS:  Examination under anesthesia reveals a 3+ effusion.  No erythema.  Incisions are well healed.  The diagnostic arthroscopy reveals cloudy yellow fluid.  There is fibrinous synovitis throughout the knee joint.  The lateral meniscus repair is intact and stable to probing.  There is grade 2-3 chondral wear of the medial femoral condyle.     fluid analysis cloudy, yellow WBC 32141 Neut 97% Gram stain - many WBCs and predom PMNs, Cultures are pending     ESR 70,  on 10/22/19     Antibiotics  - Cefazolin 10/21/19--> present ( was not on antibiotic prehospitalization)    Recent culture results include:  Culture Micro   Date Value Ref Range Status   10/21/2019 Culture negative monitoring continues  Preliminary   10/21/2019 (A)  Preliminary    On day 2, isolated in broth only:  Staphylococcus species  Speciation in progress     10/21/2019   Preliminary    Critical Value/Significant Value, preliminary result only, called to and read back by  ARNOLD ANDERSON RN [id number] 10.23.19 NDP            Past Medical History:     Past Medical History:   Diagnosis Date     GERD (gastroesophageal reflux disease)      Hypertriglyceridemia      Major depressive disorder      TANJA (obstructive sleep apnea)      Sclerosing mesenteritis (H)           Past Surgical History:     Past Surgical History:   Procedure Laterality Date     ARTHROSCOPY KNEE IRRIGATION AND DEBRIDEMENT Left 10/21/2019    Procedure: Left knee arthroscopic irrigation and debridement;  Surgeon: Bryan Spain MD;  Location: [facility] OR     BACK SURGERY  1996     L4-S1           Family History:     Family History   Problem Relation Age of Onset     Unknown/Adopted Mother      Unknown/Adopted Father           Social History:     Social History     Tobacco Use     Smoking status: Current Every Day Smoker     Packs/day: 0.00   Substance Use Topics     Alcohol use: Yes     History   Sexual Activity     Sexual activity: Not on file          Current Medications (antimicrobials listed in bold):       acetaminophen  975 mg Oral Q8H     aspirin  325 mg Oral Daily     bisacodyl  10 mg Rectal Daily     nicotine  1 patch Transdermal Daily     nicotine   Transdermal Q8H     nicotine   Transdermal Daily     omeprazole  20 mg Oral Daily     polyethylene glycol  17 g Oral Daily     QUEtiapine  25 mg Oral At Bedtime     senna-docusate  1 tablet Oral BID    Or     senna-docusate  2 tablet Oral BID     sodium chloride (PF)  3 mL Intracatheter Q8H     vancomycin (VANCOCIN) IV  2,000 mg Intravenous Q12H          Allergies:     Allergies   Allergen Reactions     Lexapro [Escitalopram] Angioedema          Physical Exam:   Vitals were reviewed  Patient Vitals for the past 24 hrs:   BP Temp Temp src Pulse Heart Rate Resp SpO2   10/23/19 1552 (!) 155/88 98  F (36.7  C) Oral -- 87 16 94 %   10/23/19 0715 (!) 151/86 97.6  F (36.4  C) -- 97 -- 16 93 %     Ranges for his vital signs:  Temp:  [97.6  F (36.4  C)-98  F (36.7  C)] 98  F (36.7  C)  Pulse:  [97] 97  Heart Rate:  [87] 87  Resp:  [16] 16  BP: (151-155)/(86-88) 155/88  SpO2:  [93 %-94 %] 94 %    Physical Examination:  GENERAL:  well-developed, well-nourished, in bed in no acute distress.   HEENT:  Head is normocephalic, atraumatic   EYES:  Eyes have anicteric sclerae without conjunctival injection or stigmata of endocarditis.    ENT:  Oropharynx is moist without exudates or ulcers. Tongue is midline  NECK:  Supple. No  Cervical lymphadenopathy  LUNGS:  Clear to auscultation bilateral.   CARDIOVASCULAR:  Regular rate and rhythm with no murmurs,  gallops or rubs.  ABDOMEN:  Normal bowel sounds, soft, nontender. No appreciable hepatosplenomegaly  SKIN: left knee - minimal swelling. mild tenderness , left ankle edema   NEUROLOGIC:  Grossly nonfocal. Active x4 extremities         Laboratory Data:     Inflammatory Markers    Recent Labs   Lab Test 10/23/19  0638 10/22/19  0102   SED  --  70*   .0* 270.0*     Hematology Studies    Recent Labs   Lab Test 10/23/19  0638 10/22/19  0534   WBC 9.9 10.1   ANEU  --  8.1   AEOS  --  0.0   HGB 11.6* 11.9*   MCV 98 96   * 465*     Metabolic Studies     Recent Labs   Lab Test 10/23/19  0638 10/22/19  0534   NA  --  133   POTASSIUM  --  4.6   CHLORIDE  --  99   CO2  --  28   BUN  --  16   CR 0.83 0.88   GFRESTIMATED >90 >90     Hepatic Studies    Recent Labs   Lab Test 10/22/19  0534   BILITOTAL 0.2   ALKPHOS 80   ALBUMIN 2.3*   AST 14   ALT 18     Microbiology:  Culture Micro   Date Value Ref Range Status   10/21/2019 Culture negative monitoring continues  Preliminary   10/21/2019 (A)  Preliminary    On day 2, isolated in broth only:  Staphylococcus species  Speciation in progress     10/21/2019   Preliminary    Critical Value/Significant Value, preliminary result only, called to and read back by  ARNOLD ANDERSON RN 0057 10.23.19 NDP

## 2019-10-23 NOTE — PROGRESS NOTES
[facility] [place]    Internal Medicine Progress Note       Assessment & Plan   Alexis Patiño is a 54 year old man with a history of obesity, untreated TANJA, tobacco use, hypertriglyceridemia, sclerosing mesenteritis, GERD, and depression who underwent inside out repair of lateral meniscus w/ Dr. Spain on 10/1/19. He developed pain and swelling concerning for septic joint and is admitted s/p left knee arthroscopic I&D on 10/21/19.      #Left Native Knee Septic Joint.   #Recent Lateral Meniscus Repair.   POD #2 from arthroscopic I&D. 96724 WBC, 97% neutrophils. Gram stain w/ many WBCs - predominantly PMNs; culture w/ staph species to date. Afebrile. No leukocytosis. -->140. Plt count reactive - 500K. Reasonable pain control post op.   - Ortho is primary.   - ID is consulted. Broadened from cefazolin to vancomycin - continue this while awaiting C&S. May end up needing PICC and OP IV abx. Wife is a RN.    -  mg daily x 2 wks post op for DVT proph per ortho.   - Pain control w/ scheduled Tylenol and PRN oxycodone.     #Elevated Blood Pressure.   #Sinus Tachycardia.   BPs 130s-150s/80s and HR 90s-110s. Denies prior HTN dx. Suspect that this is driven by pain and agitation, also need to monitor for alcohol withdrawal - was not forthcoming about home use but there is concern for overuse. No other withdrawal symptoms currently. Clinical probability of PE is currently low, but would need to remain in differential if sustained tachycardia or other new sx arise.   - Monitor. No PRN antihypertensives indicated at this time.   - Low threshold to consider initiating MSSA scoring.      Chronic/Stable Issues:   #Tobacco Use Disorder. Cessation advised.   #GERD. Continue home PPI.   #MDD. Continue HS Seroquel.  #TANJA. Non compliant w/ CPAP.     IM will follow.   The patient's care was discussed with medicine attending Dr. Schwarz.     Elen Giraldo PA-C  Hospitalist Service  [facility]  [facility]  Pager: [phone]    Interval History   Chief complaint of wanting to go home. Was frustrated with his knee brace and cares overnight. Discussed current culture data and need to wait for more information to have good plan for discharge. No f/c. Denies past issues w/ HTN. No headache, dizziness, chest pain, palpitations, tremors.     A 4 point ROS was performed (including CV, Resp, GI, ) and negative unless otherwise noted in HPI.     Physical Exam   BP (!) 151/86   Pulse 97   Temp 97.6  F (36.4  C)   Resp 16   Ht 1.829 m (6')   Wt 108 kg (238 lb)   SpO2 93%   BMI 32.28 kg/m       GENERAL: Alert and oriented x 3. Lying in bed, appears comfortable. Conversant.   HEENT: Anicteric sclera. Mucous membranes moist.   CV: RRR. S1, S2. No murmurs appreciated.   RESPIRATORY: Effort normal on room air. Lungs CTAB with no wheezing, rales, rhonchi.   GI: Abdomen soft, non distended, non tender.   MUSCULOSKELETAL: Left KI in place.   NEUROLOGICAL: No focal deficits. Moves all extremities.    EXTREMITIES: Warm and well perfused.   SKIN: No jaundice. No rashes.      Data reviewed today: I reviewed all medications, new labs and imaging results over the last 24 hours.

## 2019-10-23 NOTE — PROGRESS NOTES
Orthopaedic Surgery Progress Note:       Subjective:   Frustrated with cares regarding his knee brace overnight. Feeling fine this morning. Knee pain well controlled, significantly improved s/p washout. Denies fevers, chills, sweats. Tolerating PO trials. Voiding spontaneously. Does not like ACE wrap.     Objective:   Temp:  [97.4  F (36.3  C)-97.6  F (36.4  C)] 97.6  F (36.4  C)  Pulse:  [] 97  Resp:  [16] 16  BP: (146-151)/(85-86) 151/86  SpO2:  [92 %-93 %] 93 %    Intake/Output Summary (Last 24 hours) at 10/22/2019 0832  Last data filed at 10/22/2019 0800  Gross per 24 hour   Intake 1082 ml   Output 2480 ml   Net -1398 ml       Gen: NAD. Resting comfortably in bed  Resp: Breathing comfortably on RA  LLE:  Dressing/ACE is c/d/i. KI in place  Incisions dressings c/d/i  SILT in femoral, saphenous, sural, deep peroneal, superficial peroneal, and tibial n dist.   Fires EHL/FHL/TA/Gastroc with 5/5 strength    PT and DP pulses intact, 2+ and foot is wwp    Labs:  Recent Labs   Lab 10/23/19  0638 10/22/19  0534 10/22/19  0102   WBC 9.9 10.1  --    HGB 11.6* 11.9*  --    * 465*  --    .0*  --  270.0*       All cultures:  Recent Labs   Lab 10/21/19  2100   CULT Gram positive cocci in clusters*  Critical Value/Significant Value, preliminary result only, called to and read back by  ARNOLD ANDERSON RN 0057 10.23.19 NDP    Culture negative monitoring continues          Assessment & Plan:   Assessment/Plan: Alexis Patiño is a 54 year old male with a presumed septic arthritis of the left knee s/p arthroscopic I&D on 10/21 with Dr. Spain. Patient is now s/p left knee arthroscopy, inside out repair lateral meniscus with Dr. Spain on 10/1/19.      Ortho primary  Activity: Up with assist and assistive devices as needed  Weight bearing status: WBAT when in full extension  Brace: KI POD1 in extension to quiet joint down, then meniscus repair protocol as follows                  -transition to hinged knee brace  today                  -WBAT locked in extension when mobilizing                  -0-90 ROM when working with therapy and at rest/in chair  Antibiotics: Ancef continuous; appreciate ID recommendations                  Tailor to cultures                   PICC vs. PO abx pending bacteria speciation  Diet: Begin with clear fluids and progress diet as tolerated. Bowel regimen. Anti-emetics PRN.   DVT prophylaxis: mechanical and ASA while inpatient,  discharge on ASA 325mg daily x 2 weeks.  Elevation: Elevate heels off of bed on pillows. No pillows behind the knee at any time.   Wound Care: steristrips and primapor x3 to 3 portal sites; ACE; reinforce/change as needed  Drains: none  Pain management: transition from IV to orals as tolerated.    Resume appropriate home meds; nicotine patch PRN  Physical Therapy:ROM, ADL's.   Labs: CBC, BMP, ESR, CRP  Cultures: Pending, follow culture results closely ; clinical aspiration and intraop fluid cultures  Consults: PT, OT. Hospitalist , Infectious Disease; appreciate assistance in caring for this patient.   Follow-up: Clinic in 2 weeks for wound check with Dr. Spain or his PA Jw Paz PA-C     Disposition: Pending infectious disease recommendations and stability, anticipate discharge to Home pending abx plan    Matt Randle MD  Orthopedic Surgery PGY4  [phone]

## 2019-10-23 NOTE — PROGRESS NOTES
Therapy: IV abx  Insurance:   Ded: $500   Met: $500  Co-Insurance:100%    In reference to  admission on 10/21/19 to check IV abx coverage     Please contact Intake with any questions, [phone] or In Basket pool, [facility] ([phone]).

## 2019-10-23 NOTE — PLAN OF CARE
VS: Pt refused.   O2: RA.   Output: Voiding independently in bathroom.   Last BM: 10/20/19.   Activity: Independent with crutches and knee immobilizer, WBAT.   Skin: Incsions, abrasion.   Pain: Aching pain in left knee managed with oxycodone and scheduled tylenol.   CMS: Numbness left heel.   Dressing: CDI.   Diet: Regular.   LDA: PIV left lower forearm SL.   Equipment: IV pole, crutches, knee immobilizer, cont pulse ox, fan, foot pumps, call light within reach.   Plan: Continue to monitor.   Additional Info:

## 2019-10-23 NOTE — PLAN OF CARE
VS: Temp: 97.6  F (36.4  C) Temp src: Oral BP: (!) 151/86 Pulse: 97   Resp: 16      O2: SpO2: 93 % O2 Device: None (Room air)    Output: Urine output adequate   Last BM: 10/23   Activity: Up independently    Skin: Intact - incision   Pain: Managed with 10mg Oxycodone, ice packs, elevation   CMS: Some numbness on left heel, otherwise intact   Dressing: CDI   Diet: Regular diet, well tolerated. Denies N/V   LDA: PIV, intermittent abx   Equipment: PCDs, elevation   Plan: Cultures pending, IV abx plan pending   Additional Info:

## 2019-10-24 ENCOUNTER — TELEPHONE (OUTPATIENT)
Dept: MEDSURG UNIT | Facility: CLINIC | Age: 54
End: 2019-10-24

## 2019-10-24 ENCOUNTER — HOME INFUSION (PRE-WILLOW HOME INFUSION) (OUTPATIENT)
Dept: PHARMACY | Facility: CLINIC | Age: 54
End: 2019-10-24

## 2019-10-24 ENCOUNTER — PATIENT OUTREACH (OUTPATIENT)
Dept: CARE COORDINATION | Facility: CLINIC | Age: 54
End: 2019-10-24

## 2019-10-24 VITALS
DIASTOLIC BLOOD PRESSURE: 96 MMHG | HEART RATE: 91 BPM | SYSTOLIC BLOOD PRESSURE: 148 MMHG | OXYGEN SATURATION: 96 % | BODY MASS INDEX: 32.23 KG/M2 | HEIGHT: 72 IN | RESPIRATION RATE: 16 BRPM | WEIGHT: 238 LBS | TEMPERATURE: 97.6 F

## 2019-10-24 LAB
BACTERIA SPEC CULT: ABNORMAL
BACTERIA SPEC CULT: ABNORMAL
CREAT SERPL-MCNC: 0.87 MG/DL (ref 0.66–1.25)
GFR SERPL CREATININE-BSD FRML MDRD: >90 ML/MIN/{1.73_M2}
SPECIMEN SOURCE: ABNORMAL
VANCOMYCIN SERPL-MCNC: 14.1 MG/L

## 2019-10-24 PROCEDURE — 25800030 ZZH RX IP 258 OP 636: Performed by: ORTHOPAEDIC SURGERY

## 2019-10-24 PROCEDURE — 25000128 H RX IP 250 OP 636: Performed by: NURSE PRACTITIONER

## 2019-10-24 PROCEDURE — 36573 INSJ PICC RS&I 5 YR+: CPT

## 2019-10-24 PROCEDURE — 99232 SBSQ HOSP IP/OBS MODERATE 35: CPT | Performed by: PHYSICIAN ASSISTANT

## 2019-10-24 PROCEDURE — 25000132 ZZH RX MED GY IP 250 OP 250 PS 637: Performed by: ORTHOPAEDIC SURGERY

## 2019-10-24 PROCEDURE — 99207 ZZC CDG-MDM COMPONENT: MEETS MODERATE - UP CODED: CPT | Performed by: PHYSICIAN ASSISTANT

## 2019-10-24 PROCEDURE — 36415 COLL VENOUS BLD VENIPUNCTURE: CPT | Performed by: ORTHOPAEDIC SURGERY

## 2019-10-24 PROCEDURE — 25000132 ZZH RX MED GY IP 250 OP 250 PS 637: Performed by: INTERNAL MEDICINE

## 2019-10-24 PROCEDURE — 82565 ASSAY OF CREATININE: CPT | Performed by: ORTHOPAEDIC SURGERY

## 2019-10-24 PROCEDURE — 25000128 H RX IP 250 OP 636: Performed by: ORTHOPAEDIC SURGERY

## 2019-10-24 PROCEDURE — 25000132 ZZH RX MED GY IP 250 OP 250 PS 637: Performed by: STUDENT IN AN ORGANIZED HEALTH CARE EDUCATION/TRAINING PROGRAM

## 2019-10-24 PROCEDURE — 80202 ASSAY OF VANCOMYCIN: CPT | Performed by: ORTHOPAEDIC SURGERY

## 2019-10-24 PROCEDURE — 25000125 ZZHC RX 250: Performed by: NURSE PRACTITIONER

## 2019-10-24 RX ORDER — SACCHAROMYCES BOULARDII 250 MG
250 CAPSULE ORAL 2 TIMES DAILY
Status: DISCONTINUED | OUTPATIENT
Start: 2019-10-24 | End: 2019-10-24 | Stop reason: HOSPADM

## 2019-10-24 RX ORDER — AMOXICILLIN 250 MG
1 CAPSULE ORAL 2 TIMES DAILY
Qty: 24 TABLET | Refills: 0 | Status: SHIPPED | OUTPATIENT
Start: 2019-10-24

## 2019-10-24 RX ORDER — CEFAZOLIN SODIUM 2 G/100ML
2 INJECTION, SOLUTION INTRAVENOUS EVERY 8 HOURS
Qty: 9000 ML | Refills: 0 | Status: SHIPPED | OUTPATIENT
Start: 2019-10-22 | End: 2019-10-29

## 2019-10-24 RX ORDER — CEFAZOLIN SODIUM 2 G/100ML
2 INJECTION, SOLUTION INTRAVENOUS EVERY 8 HOURS
Status: DISCONTINUED | OUTPATIENT
Start: 2019-10-24 | End: 2019-10-24 | Stop reason: HOSPADM

## 2019-10-24 RX ORDER — ACETAMINOPHEN 500 MG
1000 TABLET ORAL 3 TIMES DAILY PRN
Qty: 50 TABLET | Refills: 0 | Status: SHIPPED | OUTPATIENT
Start: 2019-10-24

## 2019-10-24 RX ORDER — OXYCODONE HYDROCHLORIDE 5 MG/1
5-10 TABLET ORAL
Qty: 40 TABLET | Refills: 0 | Status: SHIPPED | OUTPATIENT
Start: 2019-10-24 | End: 2019-10-29

## 2019-10-24 RX ORDER — OXYCODONE HYDROCHLORIDE 5 MG/1
5-10 TABLET ORAL
Qty: 24 TABLET | Refills: 0 | Status: SHIPPED | OUTPATIENT
Start: 2019-10-24 | End: 2019-10-24

## 2019-10-24 RX ORDER — ASPIRIN 325 MG
325 TABLET, DELAYED RELEASE (ENTERIC COATED) ORAL DAILY
Qty: 14 TABLET | Refills: 0 | Status: SHIPPED | OUTPATIENT
Start: 2019-10-25

## 2019-10-24 RX ADMIN — OXYCODONE HYDROCHLORIDE 10 MG: 5 TABLET ORAL at 14:37

## 2019-10-24 RX ADMIN — ASPIRIN 325 MG: 325 TABLET, DELAYED RELEASE ORAL at 08:20

## 2019-10-24 RX ADMIN — OXYCODONE HYDROCHLORIDE 10 MG: 5 TABLET ORAL at 08:20

## 2019-10-24 RX ADMIN — ACETAMINOPHEN 975 MG: 325 TABLET, FILM COATED ORAL at 08:20

## 2019-10-24 RX ADMIN — NICOTINE 1 PATCH: 14 PATCH, EXTENDED RELEASE TRANSDERMAL at 08:20

## 2019-10-24 RX ADMIN — OXYCODONE HYDROCHLORIDE 10 MG: 5 TABLET ORAL at 04:50

## 2019-10-24 RX ADMIN — ACETAMINOPHEN 975 MG: 325 TABLET, FILM COATED ORAL at 00:12

## 2019-10-24 RX ADMIN — VANCOMYCIN HYDROCHLORIDE 2000 MG: 10 INJECTION, POWDER, LYOPHILIZED, FOR SOLUTION INTRAVENOUS at 05:02

## 2019-10-24 RX ADMIN — LIDOCAINE HYDROCHLORIDE 1 ML: 10 INJECTION, SOLUTION INFILTRATION; PERINEURAL at 11:55

## 2019-10-24 RX ADMIN — OXYCODONE HYDROCHLORIDE 10 MG: 5 TABLET ORAL at 00:12

## 2019-10-24 RX ADMIN — SENNOSIDES AND DOCUSATE SODIUM 2 TABLET: 8.6; 5 TABLET ORAL at 11:15

## 2019-10-24 RX ADMIN — OXYCODONE HYDROCHLORIDE 10 MG: 5 TABLET ORAL at 11:16

## 2019-10-24 RX ADMIN — Medication 250 MG: at 13:32

## 2019-10-24 RX ADMIN — OMEPRAZOLE 20 MG: 20 CAPSULE, DELAYED RELEASE ORAL at 08:20

## 2019-10-24 RX ADMIN — CEFAZOLIN SODIUM 2 G: 2 INJECTION, SOLUTION INTRAVENOUS at 13:28

## 2019-10-24 ASSESSMENT — ACTIVITIES OF DAILY LIVING (ADL)
ADLS_ACUITY_SCORE: 12
ADLS_ACUITY_SCORE: 10

## 2019-10-24 NOTE — DISCHARGE SUMMARY
ORTHOPAEDIC DISCHARGE SUMMARY     Date of Admission: 10/21/2019  Date of Discharge: 10/24/2019  2:48 PM  Disposition: Home  Staff Physician: No att. providers found  Primary Care Provider: Bryan Spain    DISCHARGE DIAGNOSIS:  Left knee septic arthritis s/p lateral meniscal repair    PROCEDURES: Procedure(s):  Left knee arthroscopic irrigation and debridement on 10/21/2019    BRIEF HISTORY:  54 year old male with a presumed septic arthritis of the left knee s/p arthroscopic I&D on 10/21 with Dr. Spain. Patient is now s/p left knee arthroscopy, inside out repair lateral meniscus with Dr. Spain on 10/1/19. The patient elected to pursue surgical management after thorough discussion of risks, benefits, and alternatives to surgery.     HOSPITAL COURSE:    Surgery was uncomplicated. Alexis Patiño has done well post-operatively. Medicine was consulted post operatively to aid in management of medical comorbidities. See final recommendations below. The patient received routine nursing cares and is medically stable. A PICC was placed. Infectious disease was consulted for antibiotic recommendations. Vital signs are stable. The patient is tolerating a regular diet without GI distress/nausea or vomiting. Voiding spontaneously. All PT/OT goals have been met for safe mobility. Pain is now controlled on oral medications which will be available on discharge. Stool softeners have been used while taking pain medications to help prevent constipation. Alexis Patiño is deemed medically safe to discharge.     Antibiotics:  Cefazolin 2g IV q8hr via PICC per ID recs  DVT prophylaxis:  ASA 325mg daily for 2 weeks  PT Progress: Has met PT/OT goals for safe mobility.    Pain Meds:  Weaned off all IV pain meds by discharge.  Inpatient Events: No significant events or complications.     Discharge orders and instructions as below.    FOLLOWUP:    Future Appointments   Date Time Provider Department Center   10/28/2019 10:10 AM  Johan Arceo, PT [facility]   10/31/2019  9:30 AM Johan Arceo, PT [facility]   11/6/2019 10:00 [facility]   11/8/2019  9:30 AM Maryann Payton, PTA [facility]       Appointments at [facility]: [address] [place] [id number]  Please call [phone] if you haven't heard regarding these appointments within 7 days of discharge.    PLANNED DISCHARGE ORDERS:      Discharge Medication List as of 10/24/2019  2:30 PM      START taking these medications    Details   aspirin (ASA) 325 MG EC tablet Take 1 tablet (325 mg) by mouth daily, Disp-14 tablet, R-0, E-Prescribe      ceFAZolin (ANCEF) intermittent infusion 2 g in 100 mL dextrose PRE-MIX Inject 100 mLs (2 g) into the vein every 8 hours Followed by Dr Crocker., Disp-9000 mL, R-0, E-Prescribe      senna-docusate (SENOKOT-S/PERICOLACE) 8.6-50 MG tablet Take 1 tablet by mouth 2 times daily, Disp-24 tablet, R-0, E-Prescribe         CONTINUE these medications which have CHANGED    Details   acetaminophen (TYLENOL) 500 MG tablet Take 2 tablets (1,000 mg) by mouth 3 times daily as needed for mild pain, Disp-50 tablet, R-0, E-Prescribe      oxyCODONE (ROXICODONE) 5 MG tablet Take 1-2 tablets (5-10 mg) by mouth every 3 hours as needed for moderate to severe pain, Disp-40 tablet, R-0, Local Print         CONTINUE these medications which have NOT CHANGED    Details   omeprazole (PRILOSEC) 20 MG DR capsule Take 20 mg by mouth daily, Historical      QUEtiapine (SEROQUEL) 25 MG tablet Take 25 mg by mouth At Bedtime, Historical               Discharge Procedure Orders   Home infusion referral   Referral Priority: Routine   Number of Visits Requested: 1     Home Care PT Referral for Hospital Discharge   Referral Priority: Routine Referral Type: Home Health Therapies & Aides   Number of Visits Requested: 1     Home infusion referral   Referral Priority: Routine   Number of  Visits Requested: 1     Reason for your hospital stay   Order Comments: Postoperative knee infection washout, antibiotics.     Activity   Order Comments: Your activity upon discharge: activity as tolerated within restrictions provided for operative extremity.     Order Specific Question Answer Comments   Is discharge order? Yes      When to contact your care team   Order Comments: Call your primary doctor if you have any of the following: temperature greater than 101.5,  increased shortness of breath, increased drainage or increased swelling.     Wound care and dressings   Order Comments: Instructions to care for your wound at home: Keep incisions clean, covered for at least 5 days following surgery. Then it is ok to change dressings as desired.     Discharge Instructions   Order Comments: Brace wear instructions:  - bear weight as tolerated with brace locked in extension when mobilizing  - do not place weight on operative extremity with brace unlocked, or without brace in place  - ok to unlock brace (0-90*) when working with therapy and at rest/in chair     Follow Up and recommended labs and tests   Order Comments: Follow up with Dr Spain/Jw at [facility] as planned.  Follow up with Dr Nickerson at [facility] Infectious Disease clinic [address] [place] [address]  [phone] for questions. An appointment for two weeks time has been requested for you.     Diet   Order Comments: Follow this diet upon discharge: Orders Placed This Encounter      Advance Diet as Tolerated: Regular Diet Adult     Order Specific Question Answer Comments   Is discharge order? Yes        Matt Randle MD  Orthopedic Surgery PGY4  [phone]

## 2019-10-24 NOTE — PHARMACY-VANCOMYCIN DOSING SERVICE
Pharmacy Vancomycin Note  Date of Service 2019  Patient's  1965   54 year old, male    Indication: Bone and Joint Infection  Goal Trough Level: 15-20 mg/L  Day of Therapy: 3  Current Vancomycin regimen:  2000 mg IV q12h    Current estimated CrCl = Estimated Creatinine Clearance: 121.9 mL/min (based on SCr of 0.87 mg/dL).    Creatinine for last 3 days  10/22/2019:  5:34 AM Creatinine 0.88 mg/dL  10/23/2019:  6:38 AM Creatinine 0.83 mg/dL  10/24/2019:  4:14 AM Creatinine 0.87 mg/dL    Recent Vancomycin Levels (past 3 days)  10/24/2019:  4:14 AM Vancomycin Level 14.1 mg/L    Vancomycin IV Administrations (past 72 hours)                   vancomycin (VANCOCIN) 2,000 mg in sodium chloride 0.9 % 500 mL intermittent infusion (mg) 2,000 mg New Bag 10/24/19 0502     2,000 mg New Bag 10/23/19 1619     2,000 mg New Bag  0538     2,000 mg New Bag 10/22/19 1734                Nephrotoxins and other renal medications (From now, onward)    Start     Dose/Rate Route Frequency Ordered Stop    10/22/19 1700  vancomycin (VANCOCIN) 2,000 mg in sodium chloride 0.9 % 500 mL intermittent infusion      2,000 mg  over 2 Hours Intravenous EVERY 12 HOURS 10/22/19 1613               Contrast Orders - past 72 hours (72h ago, onward)    None          Interpretation of levels and current regimen:  Trough level is  Subtherapeutic    Has serum creatinine changed > 50% in last 72 hours: No    Urine output:  good urine output    Renal Function: Stable    Plan:  1.  Increase Dose to 2250 mg q12h  2.  Pharmacy will check trough levels as appropriate in 1-3 Days.    3. Serum creatinine levels will be ordered daily for the first week of therapy and at least twice weekly for subsequent weeks.      Ynes Dooley [facility]        .

## 2019-10-24 NOTE — PLAN OF CARE
Pt A&O x's 4. VSS. Afebrile. 02 sats in the 90s on RA.Lungs clear. Denies SOB, CP and nausea. Tolerating regular diet. Bowel sound active in all quadrants. No BM but passing gas. Voiding large amount into urinal. Pain well managed with current pain regimen. CMS intact, reports baseline numbness the left heel. PIV patent and infusing abx. Pt slept between care and is able to make  needs known, call light with in reach. Will continue to monitor.

## 2019-10-24 NOTE — PROGRESS NOTES
"Orthopaedic Surgery Progress Note:       Subjective:   Antibiotics broadened to vancomycin from ancef. Planning PICC placement per ID recs. Denies fevers, chills, sweats. Tolerating PO trials. Voiding spontaneously. Fit for knew orthotic brace yesterday.     Objective:   Temp:  [97.6  F (36.4  C)-98  F (36.7  C)] 97.6  F (36.4  C)  Pulse:  [91] 91  Heart Rate:  [87-96] 96  Resp:  [16-17] 16  BP: (140-155)/(86-96) 148/96  SpO2:  [94 %-96 %] 96 %    Intake/Output Summary (Last 24 hours) at 10/22/2019 0832  Last data filed at 10/22/2019 0800  Gross per 24 hour   Intake 1082 ml   Output 2480 ml   Net -1398 ml       Gen: NAD. Resting comfortably in bed  Resp: Breathing comfortably on RA  LLE:  Dressing/ACE is c/d/i. KI in place  Incisions dressings c/d/i  SILT in femoral, saphenous, sural, deep peroneal, superficial peroneal, and tibial n dist. Reports \"dulled sensation\" in heel.  Fires EHL/FHL/TA/Gastroc with 5/5 strength    PT and DP pulses intact, 2+ and foot is wwp    Labs:  Recent Labs   Lab 10/23/19  0638 10/22/19  0534 10/22/19  0102   WBC 9.9 10.1  --    HGB 11.6* 11.9*  --    * 465*  --    .0*  --  270.0*       All cultures:  Recent Labs   Lab 10/21/19  2100   CULT On day 2, isolated in broth only:  Coagulase negative Staphylococcus  Speciation in progress  *  Critical Value/Significant Value, preliminary result only, called to and read back by  ARNOLD ANDERSON RN 0057 10.23.19 NDP    Culture negative monitoring continues          Assessment & Plan:   Assessment/Plan: Alexis Patiño is a 54 year old male with a presumed septic arthritis of the left knee s/p arthroscopic I&D on 10/21 with Dr. Spain. Patient is now s/p left knee arthroscopy, inside out repair lateral meniscus with Dr. Spain on 10/1/19.      Ortho primary  Activity: Up with assist and assistive devices as needed  Weight bearing status: WBAT when in full extension  Brace: KI POD1 in extension to quiet joint down, then meniscus repair " protocol as follows                  -transition to hinged knee brace                  -WBAT locked in extension when mobilizing                  -0-90 ROM when working with therapy and at rest/in chair  Antibiotics: Ancef continuous; appreciate ID recommendations                  Tailor to cultures                   anticipate PICC placement  Diet: Begin with clear fluids and progress diet as tolerated. Bowel regimen. Anti-emetics PRN.   DVT prophylaxis: mechanical and ASA while inpatient,  discharge on ASA 325mg daily x 2 weeks.  Elevation: Elevate heels off of bed on pillows. No pillows behind the knee at any time.   Wound Care: steristrips and primapor x3 to 3 portal sites; ACE; reinforce/change as needed  Drains: none  Pain management: transition from IV to orals as tolerated.    Resume appropriate home meds; nicotine patch PRN  Physical Therapy:ROM, ADL's.   Labs: CBC, BMP, ESR, CRP  Cultures: Pending, follow culture results closely ; clinical aspiration and intraop fluid cultures  Consults: PT, OT. Hospitalist , Infectious Disease; appreciate assistance in caring for this patient.   Follow-up: Clinic in 2 weeks for wound check with Dr. Spain or his PA Jw Paz PA-C     Disposition: Pending infectious disease recommendations - PICC placement. OK to discharge home once abx plan confirmed by ID.     Matt Randle MD  Orthopedic Surgery PGY4  [phone]

## 2019-10-24 NOTE — PLAN OF CARE
VS: VSS ex slightly elevated BP   O2: >90% on RA   Output: Voiding without difficulty via urinal or BR   Last BM: LBM 10/23 per pt report, passing flatus, BS active   Activity: Pt up independently with crutches, brace worn when OOB and locked with activity, WBAT with brace, KI worn at night per pt report.    Skin: Skin intact ex for incision to LLE, skin is warm and dry.   Pain: Pain is being managed with PRN oxycodone 10 mg Q3H   CMS: CMS intact ex for N to L heel- ortho aware. DP +2 bilaterally, able to wiggle toes. Slight swelling noted to L ankle.    Dressing: Dressings to LLE CDI, no drainage visible.    Diet: Tolerating regular diet without N/V, adequate intake of PO fluids.   LDA: PIV removed, PICC line placed and SL- will be on IV ancef Q8H   Equipment: IV pole, crutches, braces, personal belongings at bedside   Plan: Discharge home today with home infusion referral and OP PT.    Additional Info: Spouse is a nurse and states she feels comfortable helping with home infusions.      Pt and spouse were present for discharge instructions, no further questions stated. Discharge medications given and signed for. Pt has all personal belongings. PIV was removed, PICC SL and extension added by nurse liaison. Teaching for PICC completed. Pt left unit at 1500 in a wheelchair, spouse transporting home.

## 2019-10-24 NOTE — PROGRESS NOTES
Patient has clinic visit within 24-48 hours of Discharge so no post DC follow up call is needed    10/28/2019 Status: [facility]    Time: 10:10 AM Length: 40   Visit Type: EXTREMITY FOLLOW UP [1003] MICHAEL:     Provider: Johan Arceo, PT Department: [facility]

## 2019-10-24 NOTE — PROGRESS NOTES
Patient is A&O x4, calm and cooperative during the shift. Denied CP, lightheadedness, dizziness and SOB. Drinking well and voiding spontaneously without difficulties, used urinal at bed side independently, LBM today per pt. Pt is able to wiggle toes. Left knee incisional dressing is CDI, CMS intact, pain tolerable and taking PRN oxycodone, ice pack applied to knee. Pt is independent with crutches. Incentive spirometer encouraged, self repositioned and turned in bed, able to use call light appropriately and make needs known, will continue to monitor patient as POC.

## 2019-10-24 NOTE — TELEPHONE ENCOUNTER
Prior Authorization **INITIATED**    Medication: Oxycodont 5mg tablets - Quantity Limits  Insurance Company: [employer] - Phone [phone] Fax [phone]  Pharmacy Filling the Rx: [facility] - [place], [place] - [address]  Filling Pharmacy Phone: [phone]  Filling Pharmacy Fax: [phone]  Start Date: 10/24/2019  Reference #: CMM Key: [id number] - PA Case ID: [id number]  Comments:  Plan limits to 14 days of opioid medications every 60 days--anything beyond this requires prior authorization.      Angela Hein  [facility]  [address] [facility] [address]  [address], [place] [address]   [email or web address]  [email or web address]   Phone: [phone]  Pager: [phone]  Fax: [phone]

## 2019-10-24 NOTE — PROCEDURES
Procedure explained.  Questions answered.  Consent obtained.  Patient has history of blood clot after infusion of imaging contrast material in right arm approximately 1 year ago.  #4 single lumen Bard Solo Power PICC placed in left basilic vein on first attempt.   Per 3CG technology, tip terminates in SVC/RA junction.  Report phoned to ELIZABETH Gregory; PICC ok for immediate use.  Flush orders entered into Epic.

## 2019-10-24 NOTE — PROGRESS NOTES
[facility], [place]    Internal Medicine Progress Note       Assessment & Plan   Alexis Patiño is a 54 year old man with a history of obesity, untreated TANJA, tobacco use, hypertriglyceridemia, sclerosing mesenteritis, GERD, and depression who underwent inside out repair of lateral meniscus w/ Dr. Spain on 10/1/19. He developed pain and swelling concerning for septic joint and is admitted s/p left knee arthroscopic I&D on 10/21/19.      #Left Native Knee Septic Joint.   #Recent Lateral Meniscus Repair.   POD #3 from arthroscopic I&D. 40672 WBC, 97% neutrophils. Gram stain w/ many WBCs - predominantly PMNs; culture w/ CoNS. Afebrile. No leukocytosis. -->140. Plt count reactive - 500K. Reasonable pain control post op.   - Ortho is primary.   - ID following; awaiting reccs on outpatient antibiotic plan. PICC is ordered. Will need weekly CMP, CBC, CRP faxed to ID clinic.   -  mg daily x 2 wks post op for DVT proph per ortho.   - Pain control w/ scheduled Tylenol and PRN oxycodone.     #Elevated Blood Pressure. BPs 130s-150s/80s and HR initially 110s, now 90s. Denies prior HTN dx. Suspect that this is driven by pain and agitation, have also been monitoring for e/o ETOH w/d given that he was not forthcoming with his use.    - Discussed w/ patient/wife that he f/u in primary care clinic for monitoring of BPs. No intervention needed here at this time.      Chronic/Stable Issues:   #Tobacco Use Disorder. Cessation advised.   #GERD. Continue home PPI.   #MDD. Continue HS Seroquel.  #TANJA. Non compliant w/ CPAP.     IM will follow although agree with discharge today if outpatient antibiotic plan is arranged.   The patient's care was discussed with wife, ortho NP Bri, and medicine attending Dr. Schwarz.     Elen Giraldo PA-C  Hospitalist Service  [facility]  Pager: [phone]    Interval History   Chief complaint of wanting to go home. Wondering about home IV  antibiotic plan. No other concerns. Pain controlled.     A 4 point ROS was performed (including CV, Resp, GI, ) and negative unless otherwise noted in HPI.     Physical Exam   BP (!) 148/96   Pulse 91   Temp 97.6  F (36.4  C)   Resp 16   Ht 1.829 m (6')   Wt 108 kg (238 lb)   SpO2 96%   BMI 32.28 kg/m       GENERAL: Alert and oriented x 3. Sitting up in bed, appears comfortable. Conversant. Wife at bedside.   HEENT: Anicteric sclera. Mucous membranes moist.   CV: RRR. S1, S2. No murmurs appreciated.   RESPIRATORY: Effort normal on room air. Lungs CTAB with no wheezing, rales, rhonchi.   GI: Abdomen soft, non distended, non tender.   NEUROLOGICAL: No focal deficits. Moves all extremities.    EXTREMITIES: Warm and well perfused.   SKIN: No jaundice. No rashes.      Data reviewed today: I reviewed all medications, new labs and imaging results over the last 24 hours.

## 2019-10-24 NOTE — PROGRESS NOTES
GENERAL INFECTIOUS DISEASES PROGRESS NOTE     Patient:  Alexis Patiño   Date of birth 1965, Medical record number [id number]  Date of Visit:  10/24/2019  Date of Admission: 10/21/2019  Consult Requested by:Bryan Spain MD  Reason for Consult:  Septic arthritis           Assessment and Recommendations:     Alexis Patiño is a 53 yo male with history of heavy tobacco use, obesity, admitted on 10/21/19 for worsening left knee pain and swelling, fevers and sweating in the past 10 days s/p recent arthroscopy with inside out repair of his lateral meniscus on 10/1/19 with Dr Spain. ( first 12 days post surgery, kobi was doing well, tolerating PT and weight bearing) He went to ER and US was negative for DVT.  CRP> 32, WBC 10.7.  He later went to see Dr Spain's PA and a knee aspiration was done. fluid was purulent/ cloudy with >100,000 WBCs with PMN predominance.     1. Left septic knee post arthroscopy with inside out repair of his lateral meniscus on 10/1/19  - fluid analysis cloudy, yellow WBC 83744 Neut 97% Gram stain - many WBCs and predom PMNs, Cultures : Staph caprae oxacillin sensitive  - left knee arthroscopic debridement and irrigation on 10/21/19.     2. PICC placement : 10/24/19     RECOMMENDATION:  - start Cefazolin 2 gram IV q8hr , now that Staph caprae is susceptible to Oxacillin.   - Forastor daily   - weekly lab: CMP, CBC, CRP   - follow-up with me in 2 weeks   - discussed potential side effects of antibiotics     Plan discussed with patient, wife and  primary team . ID will sign off     Kale Nickerson MD, M.Med.Sc  Staff, Infectious Diseases   Pager : [phone]    Interval History   feels better. no fever, chills. left knee pain is controlled .  agrees to cefazolin.          History of Present Illness:     10/22/19  Alexis Patiño is a 53 yo male with history of heavy tobacco use, obesity, admitted on 10/21/19 for worsening left knee pain and swelling, fevers and sweating in the  past 10 days s/p recent arthroscopy with inside out repair of his lateral meniscus on 10/1/19 with Dr Spain. ( first 12 days post surgery, kobi was doing well, tolerating PT and weight bearing) He went to ER and US was negative for DVT.  CRP> 32, WBC 10.7.  He later went to see Dr Spain's PA and a knee aspiration was done. fluid was purulent/ cloudy with >100,000 WBCs with PMN predominance.     He had left knee arthroscopic debridement and irrigation on 10/21/19.    OPERATIVE FINDINGS:  Examination under anesthesia reveals a 3+ effusion.  No erythema.  Incisions are well healed.  The diagnostic arthroscopy reveals cloudy yellow fluid.  There is fibrinous synovitis throughout the knee joint.  The lateral meniscus repair is intact and stable to probing.  There is grade 2-3 chondral wear of the medial femoral condyle.     fluid analysis cloudy, yellow WBC 11938 Neut 97% Gram stain - many WBCs and predom PMNs, Cultures are pending     ESR 70,  on 10/22/19     Antibiotics  - Cefazolin 10/21/19--> present ( was not on antibiotic prehospitalization)    Recent culture results include:  Culture Micro   Date Value Ref Range Status   10/21/2019 Culture negative monitoring continues  Preliminary   10/21/2019 (A)  Final    On day 2, isolated in broth only:  Staphylococcus caprae  Identification obtained by MALDI-TOF mass spectrometry research use only database. Test   characteristics determined and verified by the Infectious Diseases Diagnostic Laboratory   ([facility]) [place], [place].     10/21/2019   Final    Critical Value/Significant Value, preliminary result only, called to and read back by  ARNOLD ANDERSON RN 0057 10.23.19 NDP            Past Medical History:     Past Medical History:   Diagnosis Date     GERD (gastroesophageal reflux disease)      Hypertriglyceridemia      Major depressive disorder      TANJA (obstructive sleep apnea)      Sclerosing mesenteritis (H)           Past Surgical History:     Past Surgical  History:   Procedure Laterality Date     ARTHROSCOPY KNEE IRRIGATION AND DEBRIDEMENT Left 10/21/2019    Procedure: Left knee arthroscopic irrigation and debridement;  Surgeon: Bryan Spain MD;  Location: [facility] OR     BACK SURGERY  1996    L4-S1           Family History:     Family History   Problem Relation Age of Onset     Unknown/Adopted Mother      Unknown/Adopted Father           Social History:     Social History     Tobacco Use     Smoking status: Current Every Day Smoker     Packs/day: 0.00   Substance Use Topics     Alcohol use: Yes     History   Sexual Activity     Sexual activity: Not on file          Current Medications (antimicrobials listed in bold):       acetaminophen  975 mg Oral Q8H     aspirin  325 mg Oral Daily     bisacodyl  10 mg Rectal Daily     ceFAZolin  2 g Intravenous Q8H     nicotine  1 patch Transdermal Daily     nicotine   Transdermal Q8H     nicotine   Transdermal Daily     omeprazole  20 mg Oral Daily     polyethylene glycol  17 g Oral Daily     QUEtiapine  25 mg Oral At Bedtime     saccharomyces boulardii  250 mg Oral BID     senna-docusate  1 tablet Oral BID    Or     senna-docusate  2 tablet Oral BID     sodium chloride (PF)  3 mL Intracatheter Q8H          Allergies:     Allergies   Allergen Reactions     Lexapro [Escitalopram] Angioedema          Physical Exam:   Vitals were reviewed  Patient Vitals for the past 24 hrs:   BP Temp Temp src Pulse Heart Rate Resp SpO2   10/24/19 0715 (!) 148/96 97.6  F (36.4  C) -- 91 -- 16 96 %   10/24/19 0137 (!) 140/86 98  F (36.7  C) Oral -- 96 17 95 %   10/23/19 1552 (!) 155/88 98  F (36.7  C) Oral -- 87 16 94 %     Ranges for his vital signs:  Temp:  [97.6  F (36.4  C)-98  F (36.7  C)] 97.6  F (36.4  C)  Pulse:  [91] 91  Heart Rate:  [87-96] 96  Resp:  [16-17] 16  BP: (140-155)/(86-96) 148/96  SpO2:  [94 %-96 %] 96 %    Physical Examination:  GENERAL:  well-developed, well-nourished, in bed in no acute distress.   HEENT:  Head is  normocephalic, atraumatic   EYES:  Eyes have anicteric sclerae without conjunctival injection or stigmata of endocarditis.    ENT:  Oropharynx is moist without exudates or ulcers. Tongue is midline  NECK:  Supple. No  Cervical lymphadenopathy  LUNGS:  Clear to auscultation bilateral.   CARDIOVASCULAR:  Regular rate and rhythm with no murmurs, gallops or rubs.  ABDOMEN:  Normal bowel sounds, soft, nontender. No appreciable hepatosplenomegaly  SKIN: left knee - minimal swelling. mild tenderness , left ankle edema   NEUROLOGIC:  Grossly nonfocal. Active x4 extremities         Laboratory Data:     Inflammatory Markers    Recent Labs   Lab Test 10/23/19  0638 10/22/19  0102   SED  --  70*   .0* 270.0*     Hematology Studies    Recent Labs   Lab Test 10/23/19  0638 10/22/19  0534   WBC 9.9 10.1   ANEU  --  8.1   AEOS  --  0.0   HGB 11.6* 11.9*   MCV 98 96   * 465*     Metabolic Studies     Recent Labs   Lab Test 10/24/19  0414 10/23/19  0638 10/22/19  0534   NA  --   --  133   POTASSIUM  --   --  4.6   CHLORIDE  --   --  99   CO2  --   --  28   BUN  --   --  16   CR 0.87 0.83 0.88   GFRESTIMATED >90 >90 >90     Hepatic Studies    Recent Labs   Lab Test 10/22/19  0534   BILITOTAL 0.2   ALKPHOS 80   ALBUMIN 2.3*   AST 14   ALT 18     Microbiology:  Culture Micro   Date Value Ref Range Status   10/21/2019 Culture negative monitoring continues  Preliminary   10/21/2019 (A)  Final    On day 2, isolated in broth only:  Staphylococcus caprae  Identification obtained by MALDI-TOF mass spectrometry research use only database. Test   characteristics determined and verified by the Infectious Diseases Diagnostic Laboratory   ([facility]) [place], [place].     10/21/2019   Final    Critical Value/Significant Value, preliminary result only, called to and read back by  ARNOLD ANDERSON RN 0057 10.23.19 NDP

## 2019-10-25 ENCOUNTER — DOCUMENTATION ONLY (OUTPATIENT)
Dept: CARE COORDINATION | Facility: CLINIC | Age: 54
End: 2019-10-25

## 2019-10-25 NOTE — PROGRESS NOTES
This is a recent snapshot of the patient's [facility] medical record.  For current drug dose and complete information and questions, call [phone] or In Basket pool, [facility] home infusion [phone])  CSN Number:  [id number]

## 2019-10-26 ENCOUNTER — TELEPHONE (OUTPATIENT)
Dept: OTHER | Facility: CLINIC | Age: 54
End: 2019-10-26

## 2019-10-26 NOTE — TELEPHONE ENCOUNTER
Call from wife (Arias) with concerns of low grade temp (100.4F) and swelling of knee. 5 days post L knee scope    Prior CoNS identified pan-suspectible    A: Septic Arthritis - Coag-neg Staph identified, elevated  -> 150mg/L, decreasing on Vanc.    Plan:  1) Continue Ancef  2) Current scheduled labs are for monday  3) if worsening over next 24 hrs, then have them come to ER for labs tomorrow (CBC, CRP, Creatinine). Last CRP was 150 mg/L.  -- I have asked wife to call me tomorrow if worsening  -- If CRP rising, then post-op infection would be possible.   Could give an additional dose of daptomycin 6mg/kg IV x1 pending labs    Pb Murphy  [id number]

## 2019-10-28 ENCOUNTER — TELEPHONE (OUTPATIENT)
Dept: INFECTIOUS DISEASES | Facility: CLINIC | Age: 54
End: 2019-10-28

## 2019-10-28 ENCOUNTER — PREP FOR PROCEDURE (OUTPATIENT)
Dept: ORTHOPEDICS | Facility: CLINIC | Age: 54
End: 2019-10-28

## 2019-10-28 DIAGNOSIS — M25.462 SWOLLEN L KNEE: Primary | ICD-10-CM

## 2019-10-28 LAB
ALBUMIN SERPL-MCNC: 2.4 G/DL (ref 3.4–5)
ALP SERPL-CCNC: 100 U/L (ref 40–150)
ALT SERPL W P-5'-P-CCNC: 19 U/L (ref 0–70)
ANION GAP SERPL CALCULATED.3IONS-SCNC: 6 MMOL/L (ref 3–14)
AST SERPL W P-5'-P-CCNC: 21 U/L (ref 0–45)
BASOPHILS # BLD AUTO: 0.1 10E9/L (ref 0–0.2)
BASOPHILS NFR BLD AUTO: 0.9 %
BILIRUB SERPL-MCNC: 0.3 MG/DL (ref 0.2–1.3)
BUN SERPL-MCNC: 15 MG/DL (ref 7–30)
CALCIUM SERPL-MCNC: 8.4 MG/DL (ref 8.5–10.1)
CHLORIDE SERPL-SCNC: 101 MMOL/L (ref 94–109)
CO2 SERPL-SCNC: 28 MMOL/L (ref 20–32)
CREAT SERPL-MCNC: 0.84 MG/DL (ref 0.66–1.25)
CRP SERPL-MCNC: 207 MG/L (ref 0–8)
DIFFERENTIAL METHOD BLD: ABNORMAL
EOSINOPHIL # BLD AUTO: 0.4 10E9/L (ref 0–0.7)
EOSINOPHIL NFR BLD AUTO: 4 %
ERYTHROCYTE [DISTWIDTH] IN BLOOD BY AUTOMATED COUNT: 11.9 % (ref 10–15)
ERYTHROCYTE [SEDIMENTATION RATE] IN BLOOD BY WESTERGREN METHOD: 86 MM/H (ref 0–20)
GFR SERPL CREATININE-BSD FRML MDRD: >90 ML/MIN/{1.73_M2}
GLUCOSE SERPL-MCNC: 127 MG/DL (ref 70–99)
HCT VFR BLD AUTO: 35.5 % (ref 40–53)
HGB BLD-MCNC: 11.4 G/DL (ref 13.3–17.7)
IMM GRANULOCYTES # BLD: 0.1 10E9/L (ref 0–0.4)
IMM GRANULOCYTES NFR BLD: 0.5 %
LYMPHOCYTES # BLD AUTO: 1.9 10E9/L (ref 0.8–5.3)
LYMPHOCYTES NFR BLD AUTO: 19 %
MCH RBC QN AUTO: 31.4 PG (ref 26.5–33)
MCHC RBC AUTO-ENTMCNC: 32.1 G/DL (ref 31.5–36.5)
MCV RBC AUTO: 98 FL (ref 78–100)
MONOCYTES # BLD AUTO: 1.9 10E9/L (ref 0–1.3)
MONOCYTES NFR BLD AUTO: 19 %
NEUTROPHILS # BLD AUTO: 5.7 10E9/L (ref 1.6–8.3)
NEUTROPHILS NFR BLD AUTO: 56.6 %
NRBC # BLD AUTO: 0 10*3/UL
NRBC BLD AUTO-RTO: 0 /100
PLATELET # BLD AUTO: 685 10E9/L (ref 150–450)
POTASSIUM SERPL-SCNC: 3.9 MMOL/L (ref 3.4–5.3)
PROT SERPL-MCNC: 6.8 G/DL (ref 6.8–8.8)
RBC # BLD AUTO: 3.63 10E12/L (ref 4.4–5.9)
SODIUM SERPL-SCNC: 135 MMOL/L (ref 133–144)
WBC # BLD AUTO: 10 10E9/L (ref 4–11)

## 2019-10-28 PROCEDURE — 85025 COMPLETE CBC W/AUTO DIFF WBC: CPT | Performed by: INTERNAL MEDICINE

## 2019-10-28 PROCEDURE — 86140 C-REACTIVE PROTEIN: CPT | Performed by: INTERNAL MEDICINE

## 2019-10-28 PROCEDURE — 85652 RBC SED RATE AUTOMATED: CPT | Performed by: INTERNAL MEDICINE

## 2019-10-28 PROCEDURE — 80053 COMPREHEN METABOLIC PANEL: CPT | Performed by: INTERNAL MEDICINE

## 2019-10-28 NOTE — TELEPHONE ENCOUNTER
Patient's wife, Arias, called to let Dr. Crocker know that patient is going to get wash out procedure done by orthopedics tomorrow due to abnormal labs and erythema, etc this weekend. Arias was wondering if patient should still have IV ancef dose tonight and tomorrow morning as planned even with the orthopedic wash out procedure happening tomorrow? Per discussion with Dr. Crocker, writer let Arias know that patient should continue with IV ancef as planned.    Mari Eddy RN

## 2019-10-28 NOTE — TELEPHONE ENCOUNTER
Prior Authorization **APPROVED/DENIED** (Partial Approval)    Authorization Effective Date:    Authorization Expiration Date:    Medication: Oxycodont 5mg tablets - Quantity Limits  Approved Dose/Quantity: Up to 8 tablets daily  Reference #: CMM Key: [id number] - PA Case ID: [id number]   Insurance Company: [employer] - Phone [phone] Fax [phone]  Expected CoPay: n/a  CoPay Card Available: No    Foundation Assistance Needed: n/a  Which Pharmacy is filling the prescription (Not needed for infusion/clinic administered): [facility] [place], [place] - [address]  Pharmacy Notified: Yes  Patient Notified:    Denial Rational:     Appeal Information:     Comments: Plan limits to 14 days of opioid medications every 60 days--anything beyond this requires prior authorization. Partial approval of medication--did not allow for greater than 8 tablets per day. Will need to appeal to allow for greater than 8 tablets per day.    Full Fax:        Angela Hein  [place] Discharge Pharmacy Liaison    [facility]  [address] [facility]  [address] [address]   [email or web address]  [email or web address]   Phone: [phone]  Pager: [phone]  Fax: [phone]

## 2019-10-29 ENCOUNTER — HOSPITAL ENCOUNTER (OUTPATIENT)
Facility: AMBULATORY SURGERY CENTER | Age: 54
End: 2019-10-29
Attending: ORTHOPAEDIC SURGERY
Payer: COMMERCIAL

## 2019-10-29 ENCOUNTER — ANESTHESIA (OUTPATIENT)
Dept: SURGERY | Facility: AMBULATORY SURGERY CENTER | Age: 54
End: 2019-10-29

## 2019-10-29 ENCOUNTER — ANESTHESIA EVENT (OUTPATIENT)
Dept: SURGERY | Facility: AMBULATORY SURGERY CENTER | Age: 54
End: 2019-10-29

## 2019-10-29 ENCOUNTER — HOME INFUSION (PRE-WILLOW HOME INFUSION) (OUTPATIENT)
Dept: PHARMACY | Facility: CLINIC | Age: 54
End: 2019-10-29

## 2019-10-29 VITALS
OXYGEN SATURATION: 94 % | BODY MASS INDEX: 30.88 KG/M2 | RESPIRATION RATE: 14 BRPM | WEIGHT: 228 LBS | DIASTOLIC BLOOD PRESSURE: 106 MMHG | HEART RATE: 91 BPM | SYSTOLIC BLOOD PRESSURE: 155 MMHG | TEMPERATURE: 97.9 F | HEIGHT: 72 IN

## 2019-10-29 DIAGNOSIS — M25.462 SWOLLEN L KNEE: ICD-10-CM

## 2019-10-29 DIAGNOSIS — Z98.890 S/P ARTHROSCOPIC KNEE SURGERY: ICD-10-CM

## 2019-10-29 RX ORDER — ONDANSETRON 2 MG/ML
INJECTION INTRAMUSCULAR; INTRAVENOUS PRN
Status: DISCONTINUED | OUTPATIENT
Start: 2019-10-29 | End: 2019-10-29

## 2019-10-29 RX ORDER — NALOXONE HYDROCHLORIDE 0.4 MG/ML
.1-.4 INJECTION, SOLUTION INTRAMUSCULAR; INTRAVENOUS; SUBCUTANEOUS
Status: DISCONTINUED | OUTPATIENT
Start: 2019-10-29 | End: 2019-10-30 | Stop reason: HOSPADM

## 2019-10-29 RX ORDER — KETAMINE HYDROCHLORIDE 10 MG/ML
INJECTION, SOLUTION INTRAMUSCULAR; INTRAVENOUS PRN
Status: DISCONTINUED | OUTPATIENT
Start: 2019-10-29 | End: 2019-10-29

## 2019-10-29 RX ORDER — OXYCODONE HYDROCHLORIDE 5 MG/1
10 TABLET ORAL ONCE
Status: COMPLETED | OUTPATIENT
Start: 2019-10-29 | End: 2019-10-29

## 2019-10-29 RX ORDER — ONDANSETRON 4 MG/1
4 TABLET, ORALLY DISINTEGRATING ORAL EVERY 30 MIN PRN
Status: DISCONTINUED | OUTPATIENT
Start: 2019-10-29 | End: 2019-10-30 | Stop reason: HOSPADM

## 2019-10-29 RX ORDER — HYDRALAZINE HYDROCHLORIDE 20 MG/ML
2.5-5 INJECTION INTRAMUSCULAR; INTRAVENOUS EVERY 10 MIN PRN
Status: DISCONTINUED | OUTPATIENT
Start: 2019-10-29 | End: 2019-10-29 | Stop reason: HOSPADM

## 2019-10-29 RX ORDER — LIDOCAINE HYDROCHLORIDE 20 MG/ML
INJECTION, SOLUTION INFILTRATION; PERINEURAL PRN
Status: DISCONTINUED | OUTPATIENT
Start: 2019-10-29 | End: 2019-10-29

## 2019-10-29 RX ORDER — ACETAMINOPHEN 325 MG/1
975 TABLET ORAL ONCE
Status: COMPLETED | OUTPATIENT
Start: 2019-10-29 | End: 2019-10-29

## 2019-10-29 RX ORDER — OXYCODONE HYDROCHLORIDE 5 MG/1
5-10 TABLET ORAL EVERY 4 HOURS PRN
Qty: 15 TABLET | Refills: 0 | Status: SHIPPED | OUTPATIENT
Start: 2019-10-29

## 2019-10-29 RX ORDER — OXYCODONE HYDROCHLORIDE 5 MG/1
5 TABLET ORAL EVERY 4 HOURS PRN
Status: DISCONTINUED | OUTPATIENT
Start: 2019-10-29 | End: 2019-10-30 | Stop reason: HOSPADM

## 2019-10-29 RX ORDER — ACETAMINOPHEN 325 MG/1
650 TABLET ORAL EVERY 4 HOURS
Qty: 120 TABLET | Refills: 0 | Status: SHIPPED | OUTPATIENT
Start: 2019-10-29

## 2019-10-29 RX ORDER — CEFAZOLIN SODIUM 1 G/50ML
1 SOLUTION INTRAVENOUS SEE ADMIN INSTRUCTIONS
Status: CANCELLED | OUTPATIENT
Start: 2019-10-29

## 2019-10-29 RX ORDER — SODIUM CHLORIDE, SODIUM LACTATE, POTASSIUM CHLORIDE, CALCIUM CHLORIDE 600; 310; 30; 20 MG/100ML; MG/100ML; MG/100ML; MG/100ML
INJECTION, SOLUTION INTRAVENOUS CONTINUOUS
Status: DISCONTINUED | OUTPATIENT
Start: 2019-10-29 | End: 2019-10-30 | Stop reason: HOSPADM

## 2019-10-29 RX ORDER — GABAPENTIN 300 MG/1
300 CAPSULE ORAL ONCE
Status: COMPLETED | OUTPATIENT
Start: 2019-10-29 | End: 2019-10-29

## 2019-10-29 RX ORDER — FENTANYL CITRATE 50 UG/ML
INJECTION, SOLUTION INTRAMUSCULAR; INTRAVENOUS PRN
Status: DISCONTINUED | OUTPATIENT
Start: 2019-10-29 | End: 2019-10-29

## 2019-10-29 RX ORDER — FENTANYL CITRATE 50 UG/ML
25-50 INJECTION, SOLUTION INTRAMUSCULAR; INTRAVENOUS
Status: DISCONTINUED | OUTPATIENT
Start: 2019-10-29 | End: 2019-10-30 | Stop reason: HOSPADM

## 2019-10-29 RX ORDER — PROPOFOL 10 MG/ML
INJECTION, EMULSION INTRAVENOUS CONTINUOUS PRN
Status: DISCONTINUED | OUTPATIENT
Start: 2019-10-29 | End: 2019-10-29

## 2019-10-29 RX ORDER — HYDROMORPHONE HYDROCHLORIDE 1 MG/ML
.3-.5 INJECTION, SOLUTION INTRAMUSCULAR; INTRAVENOUS; SUBCUTANEOUS EVERY 10 MIN PRN
Status: DISCONTINUED | OUTPATIENT
Start: 2019-10-29 | End: 2019-10-30 | Stop reason: HOSPADM

## 2019-10-29 RX ORDER — MEPERIDINE HYDROCHLORIDE 25 MG/ML
12.5 INJECTION INTRAMUSCULAR; INTRAVENOUS; SUBCUTANEOUS
Status: DISCONTINUED | OUTPATIENT
Start: 2019-10-29 | End: 2019-10-30 | Stop reason: HOSPADM

## 2019-10-29 RX ORDER — LABETALOL 20 MG/4 ML (5 MG/ML) INTRAVENOUS SYRINGE
10 EVERY 10 MIN PRN
Status: DISCONTINUED | OUTPATIENT
Start: 2019-10-29 | End: 2019-10-29 | Stop reason: HOSPADM

## 2019-10-29 RX ORDER — ONDANSETRON 2 MG/ML
4 INJECTION INTRAMUSCULAR; INTRAVENOUS EVERY 30 MIN PRN
Status: DISCONTINUED | OUTPATIENT
Start: 2019-10-29 | End: 2019-10-30 | Stop reason: HOSPADM

## 2019-10-29 RX ORDER — BUPIVACAINE HYDROCHLORIDE AND EPINEPHRINE 2.5; 5 MG/ML; UG/ML
INJECTION, SOLUTION INFILTRATION; PERINEURAL PRN
Status: DISCONTINUED | OUTPATIENT
Start: 2019-10-29 | End: 2019-10-29 | Stop reason: HOSPADM

## 2019-10-29 RX ORDER — KETOROLAC TROMETHAMINE 30 MG/ML
30 INJECTION, SOLUTION INTRAMUSCULAR; INTRAVENOUS EVERY 6 HOURS PRN
Status: DISCONTINUED | OUTPATIENT
Start: 2019-10-29 | End: 2019-10-30 | Stop reason: HOSPADM

## 2019-10-29 RX ORDER — CEFAZOLIN SODIUM 2 G/50ML
2 SOLUTION INTRAVENOUS
Status: CANCELLED | OUTPATIENT
Start: 2019-10-29

## 2019-10-29 RX ORDER — PROPOFOL 10 MG/ML
INJECTION, EMULSION INTRAVENOUS PRN
Status: DISCONTINUED | OUTPATIENT
Start: 2019-10-29 | End: 2019-10-29

## 2019-10-29 RX ORDER — FENTANYL CITRATE 50 UG/ML
25-50 INJECTION, SOLUTION INTRAMUSCULAR; INTRAVENOUS
Status: DISCONTINUED | OUTPATIENT
Start: 2019-10-29 | End: 2019-10-29 | Stop reason: HOSPADM

## 2019-10-29 RX ORDER — SODIUM CHLORIDE, SODIUM LACTATE, POTASSIUM CHLORIDE, CALCIUM CHLORIDE 600; 310; 30; 20 MG/100ML; MG/100ML; MG/100ML; MG/100ML
INJECTION, SOLUTION INTRAVENOUS CONTINUOUS
Status: DISCONTINUED | OUTPATIENT
Start: 2019-10-29 | End: 2019-10-29 | Stop reason: HOSPADM

## 2019-10-29 RX ORDER — METOPROLOL TARTRATE 1 MG/ML
1-2 INJECTION, SOLUTION INTRAVENOUS EVERY 5 MIN PRN
Status: DISCONTINUED | OUTPATIENT
Start: 2019-10-29 | End: 2019-10-29 | Stop reason: HOSPADM

## 2019-10-29 RX ORDER — ONDANSETRON 4 MG/1
4-8 TABLET, ORALLY DISINTEGRATING ORAL EVERY 8 HOURS PRN
Qty: 4 TABLET | Refills: 0 | Status: SHIPPED | OUTPATIENT
Start: 2019-10-29

## 2019-10-29 RX ADMIN — MEPERIDINE HYDROCHLORIDE 12.5 MG: 25 INJECTION INTRAMUSCULAR; INTRAVENOUS; SUBCUTANEOUS at 17:24

## 2019-10-29 RX ADMIN — FENTANYL CITRATE 50 MCG: 50 INJECTION, SOLUTION INTRAMUSCULAR; INTRAVENOUS at 17:36

## 2019-10-29 RX ADMIN — PROPOFOL 150 MCG/KG/MIN: 10 INJECTION, EMULSION INTRAVENOUS at 16:05

## 2019-10-29 RX ADMIN — FENTANYL CITRATE 50 MCG: 50 INJECTION, SOLUTION INTRAMUSCULAR; INTRAVENOUS at 17:32

## 2019-10-29 RX ADMIN — ONDANSETRON 4 MG: 2 INJECTION INTRAMUSCULAR; INTRAVENOUS at 16:31

## 2019-10-29 RX ADMIN — KETAMINE HYDROCHLORIDE 10 MG: 10 INJECTION, SOLUTION INTRAMUSCULAR; INTRAVENOUS at 15:50

## 2019-10-29 RX ADMIN — LABETALOL 20 MG/4 ML (5 MG/ML) INTRAVENOUS SYRINGE 10 MG: at 17:46

## 2019-10-29 RX ADMIN — OXYCODONE HYDROCHLORIDE 10 MG: 5 TABLET ORAL at 17:33

## 2019-10-29 RX ADMIN — FENTANYL CITRATE 50 MCG: 50 INJECTION, SOLUTION INTRAMUSCULAR; INTRAVENOUS at 16:05

## 2019-10-29 RX ADMIN — FENTANYL CITRATE 50 MCG: 50 INJECTION, SOLUTION INTRAMUSCULAR; INTRAVENOUS at 16:09

## 2019-10-29 RX ADMIN — GABAPENTIN 300 MG: 300 CAPSULE ORAL at 14:20

## 2019-10-29 RX ADMIN — SODIUM CHLORIDE, SODIUM LACTATE, POTASSIUM CHLORIDE, CALCIUM CHLORIDE: 600; 310; 30; 20 INJECTION, SOLUTION INTRAVENOUS at 14:22

## 2019-10-29 RX ADMIN — ACETAMINOPHEN 975 MG: 325 TABLET ORAL at 14:19

## 2019-10-29 RX ADMIN — LABETALOL 20 MG/4 ML (5 MG/ML) INTRAVENOUS SYRINGE 10 MG: at 17:24

## 2019-10-29 RX ADMIN — LIDOCAINE HYDROCHLORIDE 100 MG: 20 INJECTION, SOLUTION INFILTRATION; PERINEURAL at 16:06

## 2019-10-29 RX ADMIN — PROPOFOL 300 MG: 10 INJECTION, EMULSION INTRAVENOUS at 16:07

## 2019-10-29 RX ADMIN — KETAMINE HYDROCHLORIDE 10 MG: 10 INJECTION, SOLUTION INTRAMUSCULAR; INTRAVENOUS at 16:08

## 2019-10-29 ASSESSMENT — MIFFLIN-ST. JEOR: SCORE: 1907.2

## 2019-10-29 ASSESSMENT — LIFESTYLE VARIABLES: TOBACCO_USE: 1

## 2019-10-29 NOTE — DISCHARGE INSTRUCTIONS
[facility]  Home Care Following Anesthesia  For 24 hours after surgery:  1. Get plenty of rest.  A responsible adult must stay with you for at least 24 hours after you leave the surgery center.  2. Do not drive or use heavy equipment.  If you have weakness or tingling, don't drive or use heavy equipment until this feeling goes away.   3. Do not drink alcohol.   4. Avoid strenuous or risky activities.  Ask for help when climbing stairs.  5. You may feel lightheaded.  IF so, sit for a few minutes before standing.  Have someone help you get up.   6. If you have nausea (feel sick to your stomach): Drink only clear liquids such as apple juice, ginger ale, broth or 7-Up.  Rest may also help.  Be sure to drink enough fluids.  Move to a regular diet as you feel able.   7. You may have a slight fever.  Call the doctor if your fever is over 100 F (37.7 C) (taken under the tongue) or lasts longer than 24 hours.  8. You may have a dry mouth, a sore throat, muscle aches or trouble sleeping. These should go away after 24 hours.  9. Do not make important or legal decisions.       Tips for taking pain medications  To get the best pain relief possible, remember these points:    Take pain medications as directed, before pain becomes severe.    Pain medication can upset your stomach: taking it with food may help.    Constipation is a common side effect of pain medication. Drink plenty of  fluids.    Eat foods high in fiber. Take a stool softener if recommended by your doctor or pharmacist.    Do not drink alcohol, drive or operate machinery while taking pain medications.    Ask about other ways to control pain, such as with heat, ice or relaxation.    Tylenol/Acetaminophen Consumption  To help encourage the safe use of acetaminophen, the makers of TYLENOL  have lowered the maximum daily dose for single-ingredient Extra Strength TYLENOL  (acetaminophen) products sold in the [place] from 8 pills per  day (4,000 mg) to 6 pills per day (3,000 mg). The dosing interval has also changed from 2 pills every 4-6 hours to 2 pills every 6 hours.    If you feel your pain relief is insufficient, you may take Tylenol/Acetaminophen in addition to your narcotic pain medication.     Be careful not to exceed 3,000 mg of Tylenol/Acetaminophen in a 24 hour period from all sources.    If you are taking extra strength Tylenol/acetaminophen (500 mg), the maximum dose is 6 tablets in 24 hours.    If you are taking regular strength acetaminophen (325 mg), the maximum dose is 9 tablets in 24 hours.    Call a doctor for any of the followin. Signs of infection (fever, growing tenderness at the surgery site, a large amount of drainage or bleeding, severe pain, foul-smelling drainage, redness, swelling).  2. It has been over 8 to 10 hours since surgery and you are still not able to urinate (pass water).  3. Headache for over 24 hours.  4. Numbness, tingling or weakness the day after surgery (if you had spinal anesthesia).  Your doctor is:       Dr. Joe Strong, Orthopaedics: [phone]               Or dial [phone] and ask for the resident on call for:  Orthopaedics  For emergency care, call the:  [facility] Emergency Department: [phone] (TTY for hearing impaired: [phone])    Caring for Your Dexter-Enamorado Drain    You have been discharged with a Dexter-Enam[place]ado drainage tube. This tube drains fluid from your incision, helping prevent swelling and reducing the risk for infection. The tube is held in place by a few stitches. The drain will be removed when your doctor determines you no longer need it and when the amount of drainage decreases. The color and amount of fluid varies. Right after surgery, the fluid may be bright red and may become clearer over time.   Dressing:    Keep the skin around the tube dry.    If you have a dressing, change it every day.   o Wash your hands.  o Remove the old bandage. Do not use  scissors-you may accidentally cut the tube.  o Check for any redness, swelling, drainage, or broken stitches. (Call your doctor with any of these findings).  o Wash your hands again.  o Wet a cotton swab (Q-tip) and clean around the incision and the tube site. Use normal saline solution (salt and water) or soap and water. Start at the tube site and move outward in a circular motion.   o Pat dry.  o Put a new bandage on the incision and tube site. Make the bandage large enough to cover the whole incision area.   o Tape the bandage in place.  o Throw out old materials and wash your hands.   Home Care:    Tape the tube to the skin below the bandage. Make sure to keep some slack in the tube to keep it from pulling out.     DO NOT sleep on the same side as the tube.    Secure the tube and bulb inside your clothing with a safety pin. This helps keep the tube from being pulled out.     Keep the bulb compressed at all times, except when you empty it.    Empty your drain at least twice a day. Empty it more often if the drain is full.   o Lift the opening of the drain.  o Drain the fluid into a measuring cup.  o Record the amount of fluid each time you empty. Share the information with your doctor at your follow-up visit.   o Squeeze the bulb with your hands until you hear air coming out of the bulb.  o Close the opening.     Tape plastic wrap over the bandage and tube site when you shower.      Stripping  the tube helps keep blood clots from blocking the tube.                          o Hold the tubing where it leaves the skin with one hand. This keeps it from pulling on the skin.  o Pinch the tubing with the thumb and first finger of your other hand.   o Slowly and firmly pull your thumb and first finger down the tube (squeezing the tube between your fingers). Keep squeezing the tube as you run your fingers towards the bulb. If the pulling hurts or feels like it is coming out of the skin, STOP. Begin again more  gently.  o Let go of the tubing with both hands. If the tube is still blocked, repeat these steps three or four times. Make sure that the bulb is compressed so it creates suction.  When to call your doctor:    New or increased pain around the tube    Redness, warmth, or swelling around the incision or tube    Drainage that is foul smelling    Vomiting    Fever over 101 F degrees    Fluid leaking around the tube    Incision seems not to be healing    Stitches become loose    The tube falls out    Drainage that changes from light pick to dark red    A sudden increase or decrease in the amount of drainage (over 30 ml).  Your drainage record:  Date Time Bulb 1: Amount of drainage (ml or cc) Bulb 2: Amount of drainage (ml or cc) Notes

## 2019-10-29 NOTE — TELEPHONE ENCOUNTER
Medication Appeal **INITIATED**    We have initiated an appeal for the requested medication:  Medication: Oxycodont 5mg tablets - Quantity Limits  Appeal Start Date:  10/29/2019  Insurance Company: [employer] - Phone [phone] Fax [phone]  Comments: Plan limits to 14 days of opioid medications every 60 days--anything beyond this requires prior authorization. Partial approval of medication--did not allow for greater than 8 tablets per day. Will need to appeal to allow for greater than 8 tablets per day.  Appeal Letter:        Angela Hein  [place] Discharge Pharmacy Liaison    [facility]  [facility]  [address] [place] [address] [address] [place] [address]   [email or web address]  [email or web address]   Phone: [phone]  Pager: [phone]  Fax: [phone]

## 2019-10-29 NOTE — ANESTHESIA CARE TRANSFER NOTE
Patient: Alexis Patiño    Procedure(s):  Left knee arthroscopy, irrigation and debridement,    Diagnosis: Swollen L knee [M25.462]  Diagnosis Additional Information: No value filed.    Anesthesia Type:   General     Note:  Airway :Face Mask  Patient transferred to:PACU  Comments: VSS/WNL. Responds slowly to commands.Handoff Report: Identifed the Patient, Identified the Reponsible Provider, Reviewed the pertinent medical history, Discussed the surgical course, Reviewed Intra-OP anesthesia mangement and issues during anesthesia, Set expectations for post-procedure period and Allowed opportunity for questions and acknowledgement of understanding      Vitals: (Last set prior to Anesthesia Care Transfer)    CRNA VITALS  10/29/2019 1639 - 10/29/2019 1714      10/29/2019             Pulse:  93    SpO2:  100 %    Resp Rate (observed):  (!) 4                Electronically Signed By: ROSA Prasad CRNA  October 29, 2019  5:14 PM

## 2019-10-29 NOTE — ANESTHESIA PREPROCEDURE EVALUATION
"Anesthesia Pre-Procedure Evaluation    Patient: Alexis Patiño   MRN:     [id number] Gender:   male   Age:    54 year old :      1965        Preoperative Diagnosis: * No pre-op diagnosis entered *   Procedure(s):  IRRIGATION AND DEBRIDEMENT, KNEE, ARTHROSCOPIC     Past Medical History:   Diagnosis Date     GERD (gastroesophageal reflux disease)      Hypertriglyceridemia      Major depressive disorder      TANJA (obstructive sleep apnea)      Sclerosing mesenteritis (H)       Past Surgical History:   Procedure Laterality Date     ARTHROSCOPY KNEE IRRIGATION AND DEBRIDEMENT Left 10/21/2019    Procedure: Left knee arthroscopic irrigation and debridement;  Surgeon: Bryan Spain MD;  Location: [facility] OR     BACK SURGERY      L4-S1           Anesthesia Evaluation     . Pt has had prior anesthetic. Type: General and Regional    History of anesthetic complications (sore throat)    reports he \"woke up\" during spine surgery in       ROS/MED HX    ENT/Pulmonary: Comment: Used inhaler this morning due to pain    (+)sleep apnea, other ENT- right maxillary crown fell off last week, tobacco use, Current use doesn't use CPAP , . .   (-) recent URI   Neurologic:  - neg neurologic ROS     Cardiovascular: Comment: History of atypical chest pain with NM stress test negative     (+) Dyslipidemia, hypertension----. : . . . :. . Previous cardiac testing date:results:Stress Testdate: results: Satisfactory myocardial perfusion exercise stress test; patient's heart rate increased to   148 BPM at peak stress or 89 % of his age-   predicted maximum heart rate.   2.  Negative Tcm Tetrofosmin myocardial perfusion stress study; no evidence of ischemia.       -Diaphramatic attenuation (see comments) may reduce the sensitity of this examination.  LV Size and Function: Normal left ventricular size and systolic function with a visually   estimated LVEF of 60%. date: results: date: results:          METS/Exercise Tolerance: " Comment: Limited by leg pain 1 - Eating, dressing   Hematologic:  - neg hematologic  ROS       Musculoskeletal: Comment: H/o lumbar back surgery  Infected left knee  (+)  other musculoskeletal-       GI/Hepatic: Comment:  sclerosing mesenteritis, resolved        Renal/Genitourinary:  - ROS Renal section negative       Endo:     (+) Obesity, .      Psychiatric:     (+) psychiatric history depression and other (comment) (high EtOH use, no h/o w/d)      Infectious Disease: Comment: Subacute knee infection  (+) Recent Fever (knee infection with fevers),       Malignancy:      - no malignancy   Other: Comment: Reports tramadol doesn't work   (+) H/O Chronic Pain,                       PHYSICAL EXAM:   Mental Status/Neuro: A/A/O   Airway: Facies: Thick Neck  Mallampati: II  Mouth/Opening: Full  TM distance: > 6 cm  Neck ROM: Full   Respiratory: Auscultation: CTAB     Resp. Rate: Normal     Resp. Effort: Normal      CV: Rhythm: Regular  Rate: Age appropriate  Heart: Normal Sounds  Edema: None   Comments: C/o pain     Dental: Details                  LABS:  CBC:   Lab Results   Component Value Date    WBC 10.0 10/28/2019    WBC 9.9 10/23/2019    HGB 11.4 (L) 10/28/2019    HGB 11.6 (L) 10/23/2019    HCT 35.5 (L) 10/28/2019    HCT 35.9 (L) 10/23/2019     (H) 10/28/2019     (H) 10/23/2019     BMP:   Lab Results   Component Value Date     10/28/2019     10/22/2019    POTASSIUM 3.9 10/28/2019    POTASSIUM 4.6 10/22/2019    CHLORIDE 101 10/28/2019    CHLORIDE 99 10/22/2019    CO2 28 10/28/2019    CO2 28 10/22/2019    BUN 15 10/28/2019    BUN 16 10/22/2019    CR 0.84 10/28/2019    CR 0.87 10/24/2019     (H) 10/28/2019     (H) 10/22/2019     COAGS: No results found for: PTT, INR, FIBR  POC:   Lab Results   Component Value Date    BGM 98 10/21/2019     OTHER:   Lab Results   Component Value Date    CARLOS 8.4 (L) 10/28/2019    ALBUMIN 2.4 (L) 10/28/2019    PROTTOTAL 6.8 10/28/2019    ALT 19  10/28/2019    AST 21 10/28/2019    ALKPHOS 100 10/28/2019    BILITOTAL 0.3 10/28/2019    .0 (H) 10/28/2019    SED 86 (H) 10/28/2019        Preop Vitals    BP Readings from Last 3 Encounters:   10/24/19 (!) 148/96    Pulse Readings from Last 3 Encounters:   10/24/19 91      Resp Readings from Last 3 Encounters:   10/24/19 16    SpO2 Readings from Last 3 Encounters:   10/24/19 96%      Temp Readings from Last 1 Encounters:   10/24/19 36.4  C (97.6  F)    Ht Readings from Last 1 Encounters:   10/21/19 1.829 m (6')      Wt Readings from Last 1 Encounters:   10/21/19 108 kg (238 lb)    Estimated body mass index is 32.28 kg/m  as calculated from the following:    Height as of 10/21/19: 1.829 m (6').    Weight as of 10/21/19: 108 kg (238 lb).     LDA:  PICC Single Lumen 10/24/19 Left Basilic (Active)   Number of days: 5        Assessment:   ASA SCORE: 2    H&P: History and physical reviewed and following examination; no interval change.     Smoking Status:  Active Smoker       - patient did not smoke on day of surgery       - not instructed to abstain from smoking on day of procedure   NPO Status: NPO Appropriate     Plan:   Anes. Type:  General   Pre-Medication: None   Induction:  IV (Standard)   Airway: LMA   Access/Monitoring: PIV   Maintenance: Balanced     Postop Plan:   Postop Pain: Opioids  Postop Sedation/Airway: Not planned     PONV Management:   Adult Risk Factors:, Postop Opioids   Prevention: Ondansetron     CONSENT: Direct conversation   Plan and risks discussed with: Patient   Blood Products: Consent Deferred (Minimal Blood Loss)       Comments for Plan/Consent:  Plan:  GA with LMA, routine monitors, multimodal analgesia (consider intraop ketamine)    MD Reji Soto MD

## 2019-10-29 NOTE — ANESTHESIA POSTPROCEDURE EVALUATION
Anesthesia POST Procedure Evaluation    Patient: Alexis Patiño   MRN:     [id number] Gender:   male   Age:    54 year old :      1965        Preoperative Diagnosis: Swollen L knee [M25.462]   Procedure(s):  Left knee arthroscopy, irrigation and debridement,   Postop Comments: No value filed.       Anesthesia Type:  Not documented  General    Reportable Event: NO     PAIN: Uncomplicated   Sign Out status: Comfortable, Well controlled pain     PONV: No PONV   Sign Out status:  No Nausea or Vomiting     Neuro/Psych: Uneventful perioperative course   Sign Out Status: Preoperative baseline; Age appropriate mentation     Airway/Resp.: Uneventful perioperative course   Sign Out Status: Non labored breathing, age appropriate RR; Resp. Status within EXPECTED Parameters     CV: Uneventful perioperative course   Sign Out status: Appropriate BP and perfusion indices; Appropriate HR/Rhythm     Disposition:   Sign Out in:  PACU  Disposition:  Phase II; Home  Recovery Course: Uneventful  Follow-Up: Not required           Last Anesthesia Record Vitals:  CRNA VITALS  10/29/2019 1639 - 10/29/2019 1724      10/29/2019             Pulse:  93    SpO2:  100 %    Resp Rate (observed):  (!) 4          Last PACU Vitals:  Vitals Value Taken Time   /120 10/29/2019  5:19 PM   Temp 36.5  C (97.7  F) 10/29/2019  5:15 PM   Pulse 105 10/29/2019  5:19 PM   Resp 17 10/29/2019  5:23 PM   SpO2 95 % 10/29/2019  5:23 PM   Temp src     NIBP     Pulse     SpO2     Resp     Temp     Ht Rate     Temp 2     Vitals shown include unvalidated device data.      Electronically Signed By: Reji Castle MD, 2019, 5:24 PM

## 2019-10-29 NOTE — OP NOTE
PREOPERATIVE DIAGNOSIS:   1. Left recurrent septic knee  2. Status post inside-out lateral meniscus repair    POSTOPERATIVE DIAGNOSIS:  1. Left recurrent septic knee  2. Status post inside-out lateral meniscus repair    PROCEDURE:  1. Examination under anesthesia left knee  2. Left knee arthroscopy  3. Irrigation and debridement left septic knee  4. Aerobic, anaerobic cultures  5. Arthroscopic drain placement  6. Arthroscopic extensive synovectomy suprapatellar pouch, medial gutter, lateral gutter    DATE OF SURGERY: 10/29/2019    SURGEON: Joe Strong MD    ASSISTANT: None.     RESIDENT OR FELLOW: None    OPERATIVE INDICATIONS: Alexis Patiño is a pleasant 54 year old male who I saw through my orthopedic clinic with a history, physical, imaging consistent with left septic knee.  The patient is a patient of my partners who performed an inside-out repair of his lateral meniscus approximately 3-1/2 weeks ago.  The patient did very well following surgery until approximately postoperative day 12 when he had the onset of left knee pain and swelling.  An aspiration was performed which demonstrated evidence of infection.  And in urgent irrigation debridement procedure was completed.  Cultures were obtained he was started on IV antibiotics through PICC line.  Initially did very well following this procedure however the symptoms returned presented to clinic with increasing labs and given or availability my partner asked if I could help perform an irrigation debridement procedure.  I discussed met the patient the preoperative area discussed with him the risk benefits comminution techniques and alternatives to surgery and together through a combined decision making approach would like to proceed.  I did discuss with him the possibility of opening the lateral sided incision as well as performing a arthroscopic drain placement..  I reviewed with the patient the risks, benefits, complications, techniques and alternatives to  surgery.  We did discuss with infectious disease prior and they recommended changing to daptomycin.  We reviewed the expected course of recovery and the potential expected outcomes.  The patient understood both the risks and benefits and desired to proceed despite the risks.    OPERATIVE DETAILS: In the preoperative area the patient's informed consent was reviewed and they desired to proceed.  The left leg was marked and the patient was in agreement.  The patient was taken to the operating room where a timeout was performed and all parties were in agreement.  Preoperative antibiotics were given within 1 hour of the time of incision.  The patient was placed in the supine position and surrendered to LMA anesthesia.  No tourniquet was applied.  Egg crate was placed beneath the well leg and a side post was utilized.  The operative leg was prepped and draped in the usual sterile fashion.     Examination Under Anesthesia:    2+ effusion.  Stable to varus and valgus stress testing stable posterior drawer testing.  Incisions clean and dry.  Age of motion is from 5-1 25.    Sutures removed.  Patient's old incisions were utilized.  The arthroscope was placed in approximately 50 cc of clumpy partially coagulated synovial fluid was removed.  It clearly appeared infected on my review.  This was sent for aerobic, anaerobic, Gram stain, cell count.  We did ask specifically for the laboratory to hold his cultures for 2 weeks to evaluate for the evidence of P acnes.    At this time approximately 8 L of normal saline was placed through the knee joint utilizing a superior medial inflow portal as well as to anteromedial portals.  Arthroscopic extensive synovectomy was completed of all devitalized tissue.    At this time a small caliber channel drain was then placed through superior medial inflow portal out the superior lateral aspect of the knee.  Arthroscopic grasper was used to confirm it was in the lateral gutter.  In the drain was  sutured into place.    Copious irrigation was performed an a layered closure was initiated, sterile dressings were applied and the patient was transferred to the recovery room in stable condition with stable vital signs.    ESTIMATED BLOOD LOSS: 10 mL.    TOURNIQUET TIME: No tourniquet was placed.    COMPLICATIONS: None apparent.    DRAINS: None.    SPECIMENS: None.     POSTOPERATIVE PLAN:  1. Hinged knee brace.  Locked in full extension  2. Daptomycin for antibiotics, home infusion to reduce tomorrow  3. Follow-up with Dr. Spain in approximately 2 days  4. Follow new culture results  5. Follow drain output

## 2019-10-30 NOTE — PROGRESS NOTES
This is a recent snapshot of the patient's [facility] medical record.  For current drug dose and complete information and questions, call [phone] or In Basket pool, [facility] home infusion ([id number])  CSN Number:  [id number]

## 2019-10-30 NOTE — TELEPHONE ENCOUNTER
[employer] called to inform us that the appeal has been approved effective 09/29/2019 through 11/29/2019. The official approval letter will be mailed to the clinic and patient.

## 2019-10-30 NOTE — TELEPHONE ENCOUNTER
Medication Appeal **APPROVED**    Medication: Oxycodont 5mg tablets - Quantity Limits  Authorization Effective Date: 9/29/2019  Authorization Expiration Date: 11/29/2019  Approved Dose/Quantity: Up to 16 tablets daily  Reference #: CMM Key: [id number] - [place] Case ID: [id number]   Insurance Company: [employer] - Phone [phone] Fax [phone]  Expected CoPay: $7.63     CoPay Card Available: No    Foundation Assistance Needed: n/a  Which Pharmacy is filling the prescription (Not needed for infusion/clinic administered): [facility] [place], [place] - [address]  Comments: n/a      Angela Hein  [facility] [facility] Liaison    [facility]  [address]  [address], [place] [address]   [email or web address]  [email or web address]   Phone: [phone]  Pager: [phone]  Fax: [phone]

## 2019-11-03 LAB
BACTERIA SPEC CULT: NO GROWTH
SPECIMEN SOURCE: NORMAL

## 2019-11-04 ENCOUNTER — MEDICAL CORRESPONDENCE (OUTPATIENT)
Dept: HEALTH INFORMATION MANAGEMENT | Facility: CLINIC | Age: 54
End: 2019-11-04

## 2019-11-04 ENCOUNTER — TELEPHONE (OUTPATIENT)
Dept: INTERNAL MEDICINE | Facility: CLINIC | Age: 54
End: 2019-11-04

## 2019-11-04 LAB
ALBUMIN SERPL-MCNC: 2.8 G/DL (ref 3.4–5)
ALP SERPL-CCNC: 108 U/L (ref 40–150)
ALT SERPL W P-5'-P-CCNC: 21 U/L (ref 0–70)
ANION GAP SERPL CALCULATED.3IONS-SCNC: 4 MMOL/L (ref 3–14)
AST SERPL W P-5'-P-CCNC: 16 U/L (ref 0–45)
BACTERIA SPEC CULT: NORMAL
BASOPHILS # BLD AUTO: 0.1 10E9/L (ref 0–0.2)
BASOPHILS NFR BLD AUTO: 1.7 %
BILIRUB SERPL-MCNC: 0.3 MG/DL (ref 0.2–1.3)
BUN SERPL-MCNC: 20 MG/DL (ref 7–30)
CALCIUM SERPL-MCNC: 8.5 MG/DL (ref 8.5–10.1)
CHLORIDE SERPL-SCNC: 106 MMOL/L (ref 94–109)
CK SERPL-CCNC: 47 U/L (ref 30–300)
CO2 SERPL-SCNC: 27 MMOL/L (ref 20–32)
CREAT SERPL-MCNC: 0.87 MG/DL (ref 0.66–1.25)
CRP SERPL-MCNC: 11.9 MG/L (ref 0–8)
DIFFERENTIAL METHOD BLD: ABNORMAL
EOSINOPHIL # BLD AUTO: 0.4 10E9/L (ref 0–0.7)
EOSINOPHIL NFR BLD AUTO: 5.4 %
ERYTHROCYTE [DISTWIDTH] IN BLOOD BY AUTOMATED COUNT: 11.9 % (ref 10–15)
ERYTHROCYTE [SEDIMENTATION RATE] IN BLOOD BY WESTERGREN METHOD: 36 MM/H (ref 0–20)
GFR SERPL CREATININE-BSD FRML MDRD: >90 ML/MIN/{1.73_M2}
GLUCOSE SERPL-MCNC: 106 MG/DL (ref 70–99)
HCT VFR BLD AUTO: 36.6 % (ref 40–53)
HGB BLD-MCNC: 12.5 G/DL (ref 13.3–17.7)
IMM GRANULOCYTES # BLD: 0 10E9/L (ref 0–0.4)
IMM GRANULOCYTES NFR BLD: 0.5 %
LYMPHOCYTES # BLD AUTO: 1.9 10E9/L (ref 0.8–5.3)
LYMPHOCYTES NFR BLD AUTO: 24.4 %
Lab: NORMAL
MCH RBC QN AUTO: 32.1 PG (ref 26.5–33)
MCHC RBC AUTO-ENTMCNC: 34.2 G/DL (ref 31.5–36.5)
MCV RBC AUTO: 94 FL (ref 78–100)
MONOCYTES # BLD AUTO: 0.8 10E9/L (ref 0–1.3)
MONOCYTES NFR BLD AUTO: 9.6 %
NEUTROPHILS # BLD AUTO: 4.6 10E9/L (ref 1.6–8.3)
NEUTROPHILS NFR BLD AUTO: 58.4 %
NRBC # BLD AUTO: 0 10*3/UL
NRBC BLD AUTO-RTO: 0 /100
PLATELET # BLD AUTO: 586 10E9/L (ref 150–450)
POTASSIUM SERPL-SCNC: 3.9 MMOL/L (ref 3.4–5.3)
PROT SERPL-MCNC: 6.8 G/DL (ref 6.8–8.8)
RBC # BLD AUTO: 3.89 10E12/L (ref 4.4–5.9)
SODIUM SERPL-SCNC: 137 MMOL/L (ref 133–144)
SPECIMEN SOURCE: NORMAL
WBC # BLD AUTO: 7.8 10E9/L (ref 4–11)

## 2019-11-04 PROCEDURE — 82550 ASSAY OF CK (CPK): CPT | Performed by: INTERNAL MEDICINE

## 2019-11-04 PROCEDURE — 85652 RBC SED RATE AUTOMATED: CPT | Performed by: INTERNAL MEDICINE

## 2019-11-04 PROCEDURE — 86140 C-REACTIVE PROTEIN: CPT | Performed by: INTERNAL MEDICINE

## 2019-11-04 PROCEDURE — 85025 COMPLETE CBC W/AUTO DIFF WBC: CPT | Performed by: INTERNAL MEDICINE

## 2019-11-04 PROCEDURE — 80053 COMPREHEN METABOLIC PANEL: CPT | Performed by: INTERNAL MEDICINE

## 2019-11-04 NOTE — TELEPHONE ENCOUNTER
Patient contacted and reminded of upcoming appointment.  Patient confirmed they will be attending.  Patient instructed to bring updated medications list to appointment.      Kitty Alfaro CMA     11/4/2019 3:59 PM

## 2019-11-06 ENCOUNTER — OFFICE VISIT (OUTPATIENT)
Dept: INFECTIOUS DISEASES | Facility: CLINIC | Age: 54
End: 2019-11-06
Attending: INTERNAL MEDICINE
Payer: COMMERCIAL

## 2019-11-06 ENCOUNTER — HOME INFUSION (PRE-WILLOW HOME INFUSION) (OUTPATIENT)
Dept: PHARMACY | Facility: CLINIC | Age: 54
End: 2019-11-06

## 2019-11-06 VITALS
HEART RATE: 117 BPM | WEIGHT: 228 LBS | TEMPERATURE: 98.2 F | SYSTOLIC BLOOD PRESSURE: 120 MMHG | OXYGEN SATURATION: 96 % | DIASTOLIC BLOOD PRESSURE: 83 MMHG | BODY MASS INDEX: 30.92 KG/M2

## 2019-11-06 DIAGNOSIS — Z98.890 S/P ARTHROSCOPIC KNEE SURGERY: Primary | ICD-10-CM

## 2019-11-06 DIAGNOSIS — M00.062 STAPHYLOCOCCAL ARTHRITIS OF LEFT KNEE (H): ICD-10-CM

## 2019-11-06 PROCEDURE — G0463 HOSPITAL OUTPT CLINIC VISIT: HCPCS | Mod: ZF

## 2019-11-06 RX ORDER — IBUPROFEN 600 MG/1
400 TABLET, FILM COATED ORAL 3 TIMES DAILY
COMMUNITY
Start: 2019-10-01 | End: 2019-11-20

## 2019-11-06 RX ORDER — HYDROXYZINE PAMOATE 25 MG/1
25 CAPSULE ORAL
COMMUNITY
Start: 2019-10-01

## 2019-11-06 ASSESSMENT — PAIN SCALES - GENERAL: PAINLEVEL: MILD PAIN (2)

## 2019-11-06 NOTE — NURSING NOTE
Chief Complaint   Patient presents with     RECHECK     follow up post op     /83 (BP Location: Right arm, Patient Position: Sitting, Cuff Size: Adult Regular)   Pulse 117   Temp 98.2  F (36.8  C)   Wt 103.4 kg (228 lb)   SpO2 96%   BMI 30.92 kg/m        Jef Cartwright, EMT

## 2019-11-06 NOTE — LETTER
11/6/2019     RE: Alexis Patiño  [address]  [facility] [address]     Dear Colleague,    Thank you for referring your patient, Alexis Patiño, to the [facility] AND INFECTIOUS DISEASES at [facility]. Please see a copy of my visit note below.    INFECTIOUS DISEASES PROGRESS NOTE    Infectious Diseases Clinic     SUBJECTIVE:                                                      Alexis Patiño is a 53 yo male with history of heavy tobacco use, obesity, admitted on 10/21/19 for worsening left knee pain and swelling, fevers and sweating in the past 10 days s/p recent arthroscopy with inside out repair of his lateral meniscus on 10/1/19 with Dr Spain. ( first 12 days post surgery, he was doing well, tolerating PT and weight bearing) He went to ER and US was negative for DVT.  CRP> 32, WBC 10.7.  He later went to see Dr Spain's PA and a knee aspiration was done. fluid was purulent/ cloudy with >100,000 WBCs with PMN predominance. Cultures remained negative. He was hospitalized at [facility] from 10/21/19-10/24/19.  He had left knee arthroscopic debridement and irrigation on 10/21/19 and culture grew Staphylococcus  caprae in broth on day 2. It is oxacillin sensitive. He was initially on Vancomycin IV and discharged on Cefazolin 2 gram IV q8hr. He had low grade temperature 1004F on 10/26/19 with increasing left knee swelling. He had I+D abd extensive synovectomy suprapatellar pouch , medial gutter, lateral gutter and sutures removal on 10/29/19. Intra-op cultures - aerobic and anaerobic cultures remain negative. His antibiotic was switched to Daptomycin post surgery. He is here with his wife for follow-up .     He denies fever, chills, diarrhea but complains of worsening back aches, twitches in his left foot. His left knee is getting better but he is feeling frustrated due to body aches. CK levelwas normal and CRP has decreased to 11.9 on 11/4/19.      Problem list  and histories reviewed & adjusted, as indicated. Additional history: as documented    PAST MEDICAL HISTORY:  Patient  has a past medical history of GERD (gastroesophageal reflux disease), Hypertriglyceridemia, Major depressive disorder, TANJA (obstructive sleep apnea), and Sclerosing mesenteritis (H).    PAST SURGICAL HISTORY:  Patient  has a past surgical history that includes back surgery (1996); Arthroscopy knee irrigation and debridement (Left, 10/21/2019); and Irrigation and debridement lower extremity, combined (Left, 10/29/2019).    CURRENT MEDICATIONS:  Current Outpatient Medications   Medication Sig Dispense Refill     acetaminophen (TYLENOL) 500 MG tablet Take 2 tablets (1,000 mg) by mouth 3 times daily as needed for mild pain 50 tablet 0     hydrOXYzine (VISTARIL) 25 MG capsule Take 25 mg by mouth       ibuprofen (ADVIL/MOTRIN) 600 MG tablet Take 400 mg by mouth 3 times daily       omeprazole (PRILOSEC) 20 MG DR capsule Take 20 mg by mouth daily       oxyCODONE (ROXICODONE) 5 MG tablet Take 1-2 tablets (5-10 mg) by mouth every 4 hours as needed for moderate to severe pain 15 tablet 0     QUEtiapine (SEROQUEL) 25 MG tablet Take 25 mg by mouth At Bedtime       senna-docusate (SENOKOT-S/PERICOLACE) 8.6-50 MG tablet Take 1 tablet by mouth 2 times daily 24 tablet 0     acetaminophen (TYLENOL) 325 MG tablet Take 2 tablets (650 mg) by mouth every 4 hours (Patient not taking: Reported on 11/6/2019) 120 tablet 0     aspirin (ASA) 325 MG EC tablet Take 1 tablet (325 mg) by mouth daily (Patient not taking: Reported on 11/6/2019) 14 tablet 0     ondansetron (ZOFRAN-ODT) 4 MG ODT tab Take 1-2 tablets (4-8 mg) by mouth every 8 hours as needed for nausea (Patient not taking: Reported on 11/6/2019) 4 tablet 0     ALLERGY:  Allergies   Allergen Reactions     Lexapro [Escitalopram] Angioedema     SOCIAL HISTORY:  Patient  reports that he has been smoking. He has been smoking about 0.00 packs per day. He has never used  smokeless tobacco. He reports current alcohol use.    FAMILY HISTORY:  Patient's family history includes Unknown/Adopted in his father and mother.    Labs reviewed in EPIC    OBJECTIVE:                                                    /83 (BP Location: Right arm, Patient Position: Sitting, Cuff Size: Adult Regular)   Pulse 117   Temp 98.2  F (36.8  C)   Wt 103.4 kg (228 lb)   SpO2 96%   BMI 30.92 kg/m    Body mass index is 30.92 kg/m .   GENERAL: healthy, alert and no distress  EYES: Eyes grossly normal to inspection, PERRL and conjunctivae and sclerae normal  RESP: lungs clear to auscultation - no rales, rhonchi or wheezes  CV: regular rate and rhythm, normal S1 S2, no murmur  ABDOMEN: soft, nontender, no hepatosplenomegaly, no masses and bowel sounds normal  MS: left knee is less swollen, mildly warm compared to right, no redness, no sig pain   SKIN: no suspicious lesions or rashes  NEURO: Normal strength and tone, mentation intact and speech normal  PSYCH: mentation appears normal, affect - impatient/ appears upset at times  PICC line: good, non tender, no redness        ASSESSMENT AND PLAN:                                                      1. Left septic knee post arthroscopy with inside out repair of his lateral meniscus on 10/1/19   - left knee arthroscopic debridement and irrigation on 10/21/19.   - Staph caprae oxacillin sensitive, started on Cefazolin     2. PICC placement : 10/24/19      RECOMMENDATION:  -Restart Cefazolin 2 gram IV q8hr , Staph caprae is susceptible to Oxacillin. D/C daptomcyin due to body aches   - Florastor daily   - weekly lab: CMP, CBC, CRP   - follow-up with me in 2 weeks   - discussed potential side effects of antibiotics     Kale Nickerson MD, M.Med.Sc  Division of Infectious Diseases and International Medicine  [facility]

## 2019-11-06 NOTE — LETTER
11/6/2019      RE: Alexis Patiño  [address]  [facility] [address]       INFECTIOUS DISEASES PROGRESS NOTE    Infectious Diseases Clinic     SUBJECTIVE:                                                      Alexis Patiño is a 55 yo male with history of heavy tobacco use, obesity, admitted on 10/21/19 for worsening left knee pain and swelling, fevers and sweating in the past 10 days s/p recent arthroscopy with inside out repair of his lateral meniscus on 10/1/19 with Dr Spain. ( first 12 days post surgery, he was doing well, tolerating PT and weight bearing) He went to ER and US was negative for DVT.  CRP> 32, WBC 10.7.  He later went to see Dr Spain's PA and a knee aspiration was done. fluid was purulent/ cloudy with >100,000 WBCs with PMN predominance. Cultures remained negative. He was hospitalized at [facility] from 10/21/19-10/24/19.  He had left knee arthroscopic debridement and irrigation on 10/21/19 and culture grew Staphylococcus  caprae in broth on day 2. It is oxacillin sensitive. He was initially on Vancomycin IV and discharged on Cefazolin 2 gram IV q8hr. He had low grade temperature 1004F on 10/26/19 with increasing left knee swelling. He had I+D abd extensive synovectomy suprapatellar pouch , medial gutter, lateral gutter and sutures removal on 10/29/19. Intra-op cultures - aerobic and anaerobic cultures remain negative. His antibiotic was switched to Daptomycin post surgery. He is here with his wife for follow-up .     He denies fever, chills, diarrhea but complains of worsening back aches, twitches in his left foot. His left knee is getting better but he is feeling frustrated due to body aches. CK levelwas normal and CRP has decreased to 11.9 on 11/4/19.      Problem list and histories reviewed & adjusted, as indicated. Additional history: as documented    PAST MEDICAL HISTORY:  Patient  has a past medical history of GERD (gastroesophageal reflux disease), Hypertriglyceridemia, Major  depressive disorder, TANJA (obstructive sleep apnea), and Sclerosing mesenteritis (H).    PAST SURGICAL HISTORY:  Patient  has a past surgical history that includes back surgery (1996); Arthroscopy knee irrigation and debridement (Left, 10/21/2019); and Irrigation and debridement lower extremity, combined (Left, 10/29/2019).    CURRENT MEDICATIONS:  Current Outpatient Medications   Medication Sig Dispense Refill     acetaminophen (TYLENOL) 500 MG tablet Take 2 tablets (1,000 mg) by mouth 3 times daily as needed for mild pain 50 tablet 0     hydrOXYzine (VISTARIL) 25 MG capsule Take 25 mg by mouth       ibuprofen (ADVIL/MOTRIN) 600 MG tablet Take 400 mg by mouth 3 times daily       omeprazole (PRILOSEC) 20 MG DR capsule Take 20 mg by mouth daily       oxyCODONE (ROXICODONE) 5 MG tablet Take 1-2 tablets (5-10 mg) by mouth every 4 hours as needed for moderate to severe pain 15 tablet 0     QUEtiapine (SEROQUEL) 25 MG tablet Take 25 mg by mouth At Bedtime       senna-docusate (SENOKOT-S/PERICOLACE) 8.6-50 MG tablet Take 1 tablet by mouth 2 times daily 24 tablet 0     acetaminophen (TYLENOL) 325 MG tablet Take 2 tablets (650 mg) by mouth every 4 hours (Patient not taking: Reported on 11/6/2019) 120 tablet 0     aspirin (ASA) 325 MG EC tablet Take 1 tablet (325 mg) by mouth daily (Patient not taking: Reported on 11/6/2019) 14 tablet 0     ondansetron (ZOFRAN-ODT) 4 MG ODT tab Take 1-2 tablets (4-8 mg) by mouth every 8 hours as needed for nausea (Patient not taking: Reported on 11/6/2019) 4 tablet 0     ALLERGY:  Allergies   Allergen Reactions     Lexapro [Escitalopram] Angioedema     SOCIAL HISTORY:  Patient  reports that he has been smoking. He has been smoking about 0.00 packs per day. He has never used smokeless tobacco. He reports current alcohol use.    FAMILY HISTORY:  Patient's family history includes Unknown/Adopted in his father and mother.    Labs reviewed in EPIC    OBJECTIVE:                                                     /83 (BP Location: Right arm, Patient Position: Sitting, Cuff Size: Adult Regular)   Pulse 117   Temp 98.2  F (36.8  C)   Wt 103.4 kg (228 lb)   SpO2 96%   BMI 30.92 kg/m    Body mass index is 30.92 kg/m .   GENERAL: healthy, alert and no distress  EYES: Eyes grossly normal to inspection, PERRL and conjunctivae and sclerae normal  RESP: lungs clear to auscultation - no rales, rhonchi or wheezes  CV: regular rate and rhythm, normal S1 S2, no murmur  ABDOMEN: soft, nontender, no hepatosplenomegaly, no masses and bowel sounds normal  MS: left knee is less swollen, mildly warm compared to right, no redness, no sig pain   SKIN: no suspicious lesions or rashes  NEURO: Normal strength and tone, mentation intact and speech normal  PSYCH: mentation appears normal, affect - impatient/ appears upset at times  PICC line: good, non tender, no redness        ASSESSMENT AND PLAN:                                                      1. Left septic knee post arthroscopy with inside out repair of his lateral meniscus on 10/1/19   - left knee arthroscopic debridement and irrigation on 10/21/19.   - Staph caprae oxacillin sensitive, started on Cefazolin     2. PICC placement : 10/24/19      RECOMMENDATION:  -Restart Cefazolin 2 gram IV q8hr , Staph caprae is susceptible to Oxacillin. D/C daptomcyin due to body aches   - Florastor daily   - weekly lab: CMP, CBC, CRP   - follow-up with me in 2 weeks   - discussed potential side effects of antibiotics     Kale Nickerson MD, M.Med.Sc  Division of Infectious Diseases and International Medicine  [facility]            Kale Nickerson MD

## 2019-11-06 NOTE — PROGRESS NOTES
INFECTIOUS DISEASES PROGRESS NOTE    Infectious Diseases Clinic     SUBJECTIVE:                                                      Alexis Patiño is a 55 yo male with history of heavy tobacco use, obesity, admitted on 10/21/19 for worsening left knee pain and swelling, fevers and sweating in the past 10 days s/p recent arthroscopy with inside out repair of his lateral meniscus on 10/1/19 with Dr Spain. ( first 12 days post surgery, he was doing well, tolerating PT and weight bearing) He went to ER and US was negative for DVT.  CRP> 32, WBC 10.7.  He later went to see Dr Spain's PA and a knee aspiration was done. fluid was purulent/ cloudy with >100,000 WBCs with PMN predominance. Cultures remained negative. He was hospitalized at [facility] from 10/21/19-10/24/19.  He had left knee arthroscopic debridement and irrigation on 10/21/19 and culture grew Staphylococcus  caprae in broth on day 2. It is oxacillin sensitive. He was initially on Vancomycin IV and discharged on Cefazolin 2 gram IV q8hr. He had low grade temperature 1004F on 10/26/19 with increasing left knee swelling. He had I+D abd extensive synovectomy suprapatellar pouch , medial gutter, lateral gutter and sutures removal on 10/29/19. Intra-op cultures - aerobic and anaerobic cultures remain negative. His antibiotic was switched to Daptomycin post surgery. He is here with his wife for follow-up .     He denies fever, chills, diarrhea but complains of worsening back aches, twitches in his left foot. His left knee is getting better but he is feeling frustrated due to body aches. CK levelwas normal and CRP has decreased to 11.9 on 11/4/19.      Problem list and histories reviewed & adjusted, as indicated. Additional history: as documented    PAST MEDICAL HISTORY:  Patient  has a past medical history of GERD (gastroesophageal reflux disease), Hypertriglyceridemia, Major depressive disorder, TANJA (obstructive sleep apnea), and Sclerosing mesenteritis  (H).    PAST SURGICAL HISTORY:  Patient  has a past surgical history that includes back surgery (1996); Arthroscopy knee irrigation and debridement (Left, 10/21/2019); and Irrigation and debridement lower extremity, combined (Left, 10/29/2019).    CURRENT MEDICATIONS:  Current Outpatient Medications   Medication Sig Dispense Refill     acetaminophen (TYLENOL) 500 MG tablet Take 2 tablets (1,000 mg) by mouth 3 times daily as needed for mild pain 50 tablet 0     hydrOXYzine (VISTARIL) 25 MG capsule Take 25 mg by mouth       ibuprofen (ADVIL/MOTRIN) 600 MG tablet Take 400 mg by mouth 3 times daily       omeprazole (PRILOSEC) 20 MG DR capsule Take 20 mg by mouth daily       oxyCODONE (ROXICODONE) 5 MG tablet Take 1-2 tablets (5-10 mg) by mouth every 4 hours as needed for moderate to severe pain 15 tablet 0     QUEtiapine (SEROQUEL) 25 MG tablet Take 25 mg by mouth At Bedtime       senna-docusate (SENOKOT-S/PERICOLACE) 8.6-50 MG tablet Take 1 tablet by mouth 2 times daily 24 tablet 0     acetaminophen (TYLENOL) 325 MG tablet Take 2 tablets (650 mg) by mouth every 4 hours (Patient not taking: Reported on 11/6/2019) 120 tablet 0     aspirin (ASA) 325 MG EC tablet Take 1 tablet (325 mg) by mouth daily (Patient not taking: Reported on 11/6/2019) 14 tablet 0     ondansetron (ZOFRAN-ODT) 4 MG ODT tab Take 1-2 tablets (4-8 mg) by mouth every 8 hours as needed for nausea (Patient not taking: Reported on 11/6/2019) 4 tablet 0     ALLERGY:  Allergies   Allergen Reactions     Lexapro [Escitalopram] Angioedema     SOCIAL HISTORY:  Patient  reports that he has been smoking. He has been smoking about 0.00 packs per day. He has never used smokeless tobacco. He reports current alcohol use.    FAMILY HISTORY:  Patient's family history includes Unknown/Adopted in his father and mother.    Labs reviewed in EPIC    OBJECTIVE:                                                    /83 (BP Location: Right arm, Patient Position: Sitting,  Cuff Size: Adult Regular)   Pulse 117   Temp 98.2  F (36.8  C)   Wt 103.4 kg (228 lb)   SpO2 96%   BMI 30.92 kg/m   Body mass index is 30.92 kg/m .   GENERAL: healthy, alert and no distress  EYES: Eyes grossly normal to inspection, PERRL and conjunctivae and sclerae normal  RESP: lungs clear to auscultation - no rales, rhonchi or wheezes  CV: regular rate and rhythm, normal S1 S2, no murmur  ABDOMEN: soft, nontender, no hepatosplenomegaly, no masses and bowel sounds normal  MS: left knee is less swollen, mildly warm compared to right, no redness, no sig pain   SKIN: no suspicious lesions or rashes  NEURO: Normal strength and tone, mentation intact and speech normal  PSYCH: mentation appears normal, affect - impatient/ appears upset at times  PICC line: good, non tender, no redness        ASSESSMENT AND PLAN:                                                      1. Left septic knee post arthroscopy with inside out repair of his lateral meniscus on 10/1/19   - left knee arthroscopic debridement and irrigation on 10/21/19.   - Staph caprae oxacillin sensitive, started on Cefazolin     2. PICC placement : 10/24/19      RECOMMENDATION:  -Restart Cefazolin 2 gram IV q8hr , Staph caprae is susceptible to Oxacillin. D/C daptomcyin due to body aches   - Florastor daily   - weekly lab: CMP, CBC, CRP   - follow-up with me in 2 weeks   - discussed potential side effects of antibiotics     Kale Nickerson MD, M.Med.Sc  Division of Infectious Diseases and International Medicine  [facility]

## 2019-11-07 NOTE — PROGRESS NOTES
This is a recent snapshot of the patient's [facility] medical record.  For current drug dose and complete information and questions, call [phone] or In Basket pool, fv home infusion ([id number])  CSN Number:  [id number]

## 2019-11-11 ENCOUNTER — MEDICAL CORRESPONDENCE (OUTPATIENT)
Dept: HEALTH INFORMATION MANAGEMENT | Facility: CLINIC | Age: 54
End: 2019-11-11

## 2019-11-11 ENCOUNTER — HOME INFUSION (PRE-WILLOW HOME INFUSION) (OUTPATIENT)
Dept: PHARMACY | Facility: CLINIC | Age: 54
End: 2019-11-11

## 2019-11-11 ENCOUNTER — TELEPHONE (OUTPATIENT)
Dept: INFECTIOUS DISEASES | Facility: CLINIC | Age: 54
End: 2019-11-11

## 2019-11-11 LAB
ANION GAP SERPL CALCULATED.3IONS-SCNC: 6 MMOL/L (ref 3–14)
AST SERPL W P-5'-P-CCNC: 20 U/L (ref 0–45)
BASOPHILS # BLD AUTO: 0.1 10E9/L (ref 0–0.2)
BASOPHILS NFR BLD AUTO: 0.9 %
BUN SERPL-MCNC: 14 MG/DL (ref 7–30)
CALCIUM SERPL-MCNC: 8.6 MG/DL (ref 8.5–10.1)
CHLORIDE SERPL-SCNC: 106 MMOL/L (ref 94–109)
CO2 SERPL-SCNC: 27 MMOL/L (ref 20–32)
CREAT SERPL-MCNC: 0.77 MG/DL (ref 0.66–1.25)
CRP SERPL-MCNC: 11.5 MG/L (ref 0–8)
DIFFERENTIAL METHOD BLD: ABNORMAL
EOSINOPHIL # BLD AUTO: 0.6 10E9/L (ref 0–0.7)
EOSINOPHIL NFR BLD AUTO: 5.4 %
ERYTHROCYTE [DISTWIDTH] IN BLOOD BY AUTOMATED COUNT: 12.2 % (ref 10–15)
ERYTHROCYTE [SEDIMENTATION RATE] IN BLOOD BY WESTERGREN METHOD: 23 MM/H (ref 0–20)
GFR SERPL CREATININE-BSD FRML MDRD: >90 ML/MIN/{1.73_M2}
GLUCOSE SERPL-MCNC: 86 MG/DL (ref 70–99)
HCT VFR BLD AUTO: 37.7 % (ref 40–53)
HGB BLD-MCNC: 12.6 G/DL (ref 13.3–17.7)
IMM GRANULOCYTES # BLD: 0 10E9/L (ref 0–0.4)
IMM GRANULOCYTES NFR BLD: 0.2 %
LYMPHOCYTES # BLD AUTO: 1.2 10E9/L (ref 0.8–5.3)
LYMPHOCYTES NFR BLD AUTO: 10.5 %
MCH RBC QN AUTO: 31.7 PG (ref 26.5–33)
MCHC RBC AUTO-ENTMCNC: 33.4 G/DL (ref 31.5–36.5)
MCV RBC AUTO: 95 FL (ref 78–100)
MONOCYTES # BLD AUTO: 1.1 10E9/L (ref 0–1.3)
MONOCYTES NFR BLD AUTO: 9.7 %
NEUTROPHILS # BLD AUTO: 8.2 10E9/L (ref 1.6–8.3)
NEUTROPHILS NFR BLD AUTO: 73.3 %
NRBC # BLD AUTO: 0 10*3/UL
NRBC BLD AUTO-RTO: 0 /100
PLATELET # BLD AUTO: 339 10E9/L (ref 150–450)
POTASSIUM SERPL-SCNC: 4 MMOL/L (ref 3.4–5.3)
RBC # BLD AUTO: 3.98 10E12/L (ref 4.4–5.9)
SODIUM SERPL-SCNC: 139 MMOL/L (ref 133–144)
WBC # BLD AUTO: 11.2 10E9/L (ref 4–11)

## 2019-11-11 PROCEDURE — 86140 C-REACTIVE PROTEIN: CPT | Performed by: INTERNAL MEDICINE

## 2019-11-11 PROCEDURE — 80048 BASIC METABOLIC PNL TOTAL CA: CPT | Performed by: INTERNAL MEDICINE

## 2019-11-11 PROCEDURE — 84450 TRANSFERASE (AST) (SGOT): CPT | Performed by: INTERNAL MEDICINE

## 2019-11-11 PROCEDURE — 85652 RBC SED RATE AUTOMATED: CPT | Performed by: INTERNAL MEDICINE

## 2019-11-11 PROCEDURE — 85025 COMPLETE CBC W/AUTO DIFF WBC: CPT | Performed by: INTERNAL MEDICINE

## 2019-11-11 NOTE — TELEPHONE ENCOUNTER
[facility] Call Center    Phone Message    May a detailed message be left on voicemail: yes    Reason for Call: Requesting Results   Name/type of test: ast, basic metabolic, cbc, crp, erythrocyte   Date of test: 11/11/19  Was test done at a location other than [facility]? [facility]    Action Taken: Message routed to:  [facility] ([facility]): ID

## 2019-11-11 NOTE — TELEPHONE ENCOUNTER
[facility] Call Center    Phone Message    May a detailed message be left on voicemail: yes    Reason for Call: Other: Per Pts Wife, is wanting to get a call back in regards to getting a 2 week follow up apt. Pts wife states Pt canot wait unil 1/3/2020 to  be seen for a follow up and wanting to get a call back. Pts wife states apt can be a phone call also.      Action Taken: Message routed to:  [facility] ([facility]): [facility]

## 2019-11-12 LAB
BACTERIA SPEC CULT: NORMAL
Lab: NORMAL
SPECIMEN SOURCE: NORMAL

## 2019-11-12 NOTE — PROGRESS NOTES
This is a recent snapshot of the patient's [facility] medical record.  For current drug dose and complete information and questions, call [phone] or In Basket pool, fv home infusion ([id number])  CSN Number:  [id number]

## 2019-11-12 NOTE — TELEPHONE ENCOUNTER
Spoke with pt's wife re: appt. Scheduled him for 11/20 at 10am with Dr. Crocker.  Karla Andrew RN

## 2019-11-12 NOTE — TELEPHONE ENCOUNTER
[facility] Call Center    Phone Message    May a detailed message be left on voicemail: yes    Reason for Call: Other: Pt's wife is calling back to FU on lab results. Please call pt's wife at [phone].     Action Taken: Message routed to:  [facility] ([facility]): ID

## 2019-11-12 NOTE — TELEPHONE ENCOUNTER
Called pt's spouse and reviewed lab results with her. Noted that inflammatory markers are decreasing, kidney and liver function look good, etc.  Karla Andrew RN

## 2019-11-18 ENCOUNTER — MEDICAL CORRESPONDENCE (OUTPATIENT)
Dept: HEALTH INFORMATION MANAGEMENT | Facility: CLINIC | Age: 54
End: 2019-11-18

## 2019-11-18 ENCOUNTER — HOME INFUSION (PRE-WILLOW HOME INFUSION) (OUTPATIENT)
Dept: PHARMACY | Facility: CLINIC | Age: 54
End: 2019-11-18

## 2019-11-18 LAB
ALBUMIN SERPL-MCNC: 3 G/DL (ref 3.4–5)
ALP SERPL-CCNC: 131 U/L (ref 40–150)
ALT SERPL W P-5'-P-CCNC: 12 U/L (ref 0–70)
ANION GAP SERPL CALCULATED.3IONS-SCNC: 5 MMOL/L (ref 3–14)
AST SERPL W P-5'-P-CCNC: 13 U/L (ref 0–45)
BASOPHILS # BLD AUTO: 0.1 10E9/L (ref 0–0.2)
BASOPHILS NFR BLD AUTO: 1.3 %
BILIRUB SERPL-MCNC: 0.5 MG/DL (ref 0.2–1.3)
BUN SERPL-MCNC: 21 MG/DL (ref 7–30)
CALCIUM SERPL-MCNC: 8 MG/DL (ref 8.5–10.1)
CHLORIDE SERPL-SCNC: 108 MMOL/L (ref 94–109)
CO2 SERPL-SCNC: 26 MMOL/L (ref 20–32)
CREAT SERPL-MCNC: 0.78 MG/DL (ref 0.66–1.25)
CRP SERPL-MCNC: 5.4 MG/L (ref 0–8)
DIFFERENTIAL METHOD BLD: ABNORMAL
EOSINOPHIL # BLD AUTO: 1 10E9/L (ref 0–0.7)
EOSINOPHIL NFR BLD AUTO: 15.4 %
ERYTHROCYTE [DISTWIDTH] IN BLOOD BY AUTOMATED COUNT: 12.4 % (ref 10–15)
ERYTHROCYTE [SEDIMENTATION RATE] IN BLOOD BY WESTERGREN METHOD: 13 MM/H (ref 0–20)
GFR SERPL CREATININE-BSD FRML MDRD: >90 ML/MIN/{1.73_M2}
GLUCOSE SERPL-MCNC: 172 MG/DL (ref 70–99)
HCT VFR BLD AUTO: 36.1 % (ref 40–53)
HGB BLD-MCNC: 11.8 G/DL (ref 13.3–17.7)
IMM GRANULOCYTES # BLD: 0 10E9/L (ref 0–0.4)
IMM GRANULOCYTES NFR BLD: 0.3 %
LYMPHOCYTES # BLD AUTO: 1.6 10E9/L (ref 0.8–5.3)
LYMPHOCYTES NFR BLD AUTO: 25.5 %
MCH RBC QN AUTO: 31.1 PG (ref 26.5–33)
MCHC RBC AUTO-ENTMCNC: 32.7 G/DL (ref 31.5–36.5)
MCV RBC AUTO: 95 FL (ref 78–100)
MONOCYTES # BLD AUTO: 0.7 10E9/L (ref 0–1.3)
MONOCYTES NFR BLD AUTO: 10.8 %
NEUTROPHILS # BLD AUTO: 3 10E9/L (ref 1.6–8.3)
NEUTROPHILS NFR BLD AUTO: 46.7 %
NRBC # BLD AUTO: 0 10*3/UL
NRBC BLD AUTO-RTO: 0 /100
PLATELET # BLD AUTO: 250 10E9/L (ref 150–450)
POTASSIUM SERPL-SCNC: 3.5 MMOL/L (ref 3.4–5.3)
PROT SERPL-MCNC: 6.4 G/DL (ref 6.8–8.8)
RBC # BLD AUTO: 3.79 10E12/L (ref 4.4–5.9)
SODIUM SERPL-SCNC: 139 MMOL/L (ref 133–144)
WBC # BLD AUTO: 6.4 10E9/L (ref 4–11)

## 2019-11-18 PROCEDURE — 85652 RBC SED RATE AUTOMATED: CPT | Performed by: INTERNAL MEDICINE

## 2019-11-18 PROCEDURE — 80053 COMPREHEN METABOLIC PANEL: CPT | Performed by: INTERNAL MEDICINE

## 2019-11-18 PROCEDURE — 86140 C-REACTIVE PROTEIN: CPT | Performed by: INTERNAL MEDICINE

## 2019-11-18 PROCEDURE — 85025 COMPLETE CBC W/AUTO DIFF WBC: CPT | Performed by: INTERNAL MEDICINE

## 2019-11-19 NOTE — PROGRESS NOTES
This is a recent snapshot of the patient's [facility] medical record.  For current drug dose and complete information and questions, call [phone] or In Basket pool, fv home infusion ([id number])  CSN Number:  [id number]

## 2019-11-20 ENCOUNTER — HOME INFUSION (PRE-WILLOW HOME INFUSION) (OUTPATIENT)
Dept: PHARMACY | Facility: CLINIC | Age: 54
End: 2019-11-20

## 2019-11-20 ENCOUNTER — OFFICE VISIT (OUTPATIENT)
Dept: INFECTIOUS DISEASES | Facility: CLINIC | Age: 54
End: 2019-11-20
Attending: INTERNAL MEDICINE
Payer: COMMERCIAL

## 2019-11-20 VITALS
DIASTOLIC BLOOD PRESSURE: 97 MMHG | HEART RATE: 99 BPM | OXYGEN SATURATION: 98 % | TEMPERATURE: 97.9 F | BODY MASS INDEX: 30.92 KG/M2 | WEIGHT: 228 LBS | SYSTOLIC BLOOD PRESSURE: 163 MMHG

## 2019-11-20 DIAGNOSIS — Z98.890 S/P ARTHROSCOPIC KNEE SURGERY: ICD-10-CM

## 2019-11-20 DIAGNOSIS — M00.062 STAPHYLOCOCCAL ARTHRITIS OF LEFT KNEE (H): Primary | ICD-10-CM

## 2019-11-20 PROCEDURE — G0463 HOSPITAL OUTPT CLINIC VISIT: HCPCS | Mod: ZF

## 2019-11-20 RX ORDER — IBUPROFEN 200 MG
400 TABLET ORAL EVERY 8 HOURS PRN
Status: CANCELLED | COMMUNITY
Start: 2019-11-20

## 2019-11-20 ASSESSMENT — PAIN SCALES - GENERAL: PAINLEVEL: NO PAIN (0)

## 2019-11-20 NOTE — LETTER
11/20/2019     RE: Alexis Patiño  [address]  [facility] [address]     Dear Colleague,    Thank you for referring your patient, Alexis Patiño, to the [facility] AND INFECTIOUS DISEASES at [facility]. Please see a copy of my visit note below.    INFECTIOUS DISEASES PROGRESS NOTE    Infectious Diseases Clinic     SUBJECTIVE:                                                      Alexis Patiño is a 53 yo male with history of heavy tobacco use, obesity, admitted on 10/21/19 for worsening left knee pain and swelling, fevers and sweating in the past 10 days s/p recent arthroscopy with inside out repair of his lateral meniscus on 10/1/19 with Dr Spain. ( first 12 days post surgery, he was doing well, tolerating PT and weight bearing) He went to ER and US was negative for DVT.  CRP> 32, WBC 10.7.  He later went to see Dr Spain's PA and a knee aspiration was done. fluid was purulent/ cloudy with >100,000 WBCs with PMN predominance. Cultures remained negative. He was hospitalized at [facility] from 10/21/19-10/24/19.  He had left knee arthroscopic debridement and irrigation on 10/21/19 and culture grew Staphylococcus  caprae in broth on day 2. It is oxacillin sensitive. He was initially on Vancomycin IV and discharged on Cefazolin 2 gram IV q8hr. He had low grade temperature 1004F on 10/26/19 with increasing left knee swelling. He had I+D abd extensive synovectomy suprapatellar pouch , medial gutter, lateral gutter and sutures removal on 10/29/19. Intra-op cultures - aerobic and anaerobic cultures remain negative. His antibiotic was switched to Daptomycin post surgery. He is here with his wife for follow-up .     He denies fever, chills, tolerates Cefazolin. body ache has resolved. able to walk without any walking aid. still on Ibuprofen 400 mg po q8hr but had stopped Oxycodone. drinks alcohol periodically. had diarrhea recently but no abdominal pain. otherwise he is  doing better.     Problem list and histories reviewed & adjusted, as indicated. Additional history: as documented    PAST MEDICAL HISTORY:  Patient  has a past medical history of GERD (gastroesophageal reflux disease), Hypertriglyceridemia, Major depressive disorder, TANJA (obstructive sleep apnea), and Sclerosing mesenteritis (H).    PAST SURGICAL HISTORY:  Patient  has a past surgical history that includes back surgery (1996); Arthroscopy knee irrigation and debridement (Left, 10/21/2019); and Irrigation and debridement lower extremity, combined (Left, 10/29/2019).    CURRENT MEDICATIONS:  Current Outpatient Medications   Medication Sig Dispense Refill     acetaminophen (TYLENOL) 325 MG tablet Take 2 tablets (650 mg) by mouth every 4 hours (Patient not taking: Reported on 11/6/2019) 120 tablet 0     acetaminophen (TYLENOL) 500 MG tablet Take 2 tablets (1,000 mg) by mouth 3 times daily as needed for mild pain 50 tablet 0     aspirin (ASA) 325 MG EC tablet Take 1 tablet (325 mg) by mouth daily (Patient not taking: Reported on 11/6/2019) 14 tablet 0     hydrOXYzine (VISTARIL) 25 MG capsule Take 25 mg by mouth       omeprazole (PRILOSEC) 20 MG DR capsule Take 20 mg by mouth daily       ondansetron (ZOFRAN-ODT) 4 MG ODT tab Take 1-2 tablets (4-8 mg) by mouth every 8 hours as needed for nausea (Patient not taking: Reported on 11/6/2019) 4 tablet 0     oxyCODONE (ROXICODONE) 5 MG tablet Take 1-2 tablets (5-10 mg) by mouth every 4 hours as needed for moderate to severe pain 15 tablet 0     QUEtiapine (SEROQUEL) 25 MG tablet Take 25 mg by mouth At Bedtime       senna-docusate (SENOKOT-S/PERICOLACE) 8.6-50 MG tablet Take 1 tablet by mouth 2 times daily 24 tablet 0     ALLERGY:  Allergies   Allergen Reactions     Lexapro [Escitalopram] Angioedema     SOCIAL HISTORY:  Patient  reports that he has been smoking. He has been smoking about 0.00 packs per day. He has never used smokeless tobacco. He reports current alcohol  use.    FAMILY HISTORY:  Patient's family history includes Unknown/Adopted in his father and mother.    Labs reviewed in EPIC    OBJECTIVE:                                                    BP (!) 163/97 (BP Location: Right arm, Patient Position: Sitting, Cuff Size: Adult Regular)   Pulse 99   Temp 97.9  F (36.6  C)   Wt 103.4 kg (228 lb)   SpO2 98%   BMI 30.92 kg/m    Body mass index is 30.92 kg/m .   GENERAL: healthy, alert and no distress  EYES: Eyes grossly normal to inspection, PERRL and conjunctivae and sclerae normal  MS: left knee is significantly better, minimal swelling , no redness, non tender    SKIN: no suspicious lesions or rashes  NEURO: Normal strength and tone, mentation intact and speech normal  PSYCH: mentation appears normal  PICC line: good, non tender, no redness        ASSESSMENT AND PLAN:                                                      1. Left septic knee post arthroscopy with inside out repair of his lateral meniscus on 10/1/19   - left knee arthroscopic debridement and irrigation on 10/21/19.   - Staph caprae oxacillin sensitive, started on Cefazolin     2. PICC placement : 10/24/19      RECOMMENDATION:  - left knee surgical site - significant improvement    - continue Cefazolin 2 gram IV q8hr , Staph caprae is susceptible to Oxacillin.    - will recheck CRP next week and if it continues to improve, may stop Cefazolin on Nov 26 , remove PICC and switch to PO keflex 500 mg po q6hr x 1-2 weeks    - continue probiotic  daily   - weekly lab: CMP, CBC, CRP   - discussed potential side effects of antibiotics   - stop scheduled  Ibuprofen use , may use Oxycodone ( still hassome at home) or tylenol on as needed basis   - due to distance , he requests no follow-up but agrees to call if he has any concerns     Follow-up KIKE Nickerson MD, M.Med.Sc  Division of Infectious Diseases and International Medicine  [facility]

## 2019-11-20 NOTE — PROGRESS NOTES
INFECTIOUS DISEASES PROGRESS NOTE    Infectious Diseases Clinic     SUBJECTIVE:                                                      Alexis Patiño is a 53 yo male with history of heavy tobacco use, obesity, admitted on 10/21/19 for worsening left knee pain and swelling, fevers and sweating in the past 10 days s/p recent arthroscopy with inside out repair of his lateral meniscus on 10/1/19 with Dr Spain. ( first 12 days post surgery, he was doing well, tolerating PT and weight bearing) He went to ER and US was negative for DVT.  CRP> 32, WBC 10.7.  He later went to see Dr Spain's PA and a knee aspiration was done. fluid was purulent/ cloudy with >100,000 WBCs with PMN predominance. Cultures remained negative. He was hospitalized at [facility] from 10/21/19-10/24/19.  He had left knee arthroscopic debridement and irrigation on 10/21/19 and culture grew Staphylococcus  caprae in broth on day 2. It is oxacillin sensitive. He was initially on Vancomycin IV and discharged on Cefazolin 2 gram IV q8hr. He had low grade temperature 1004F on 10/26/19 with increasing left knee swelling. He had I+D abd extensive synovectomy suprapatellar pouch , medial gutter, lateral gutter and sutures removal on 10/29/19. Intra-op cultures - aerobic and anaerobic cultures remain negative. His antibiotic was switched to Daptomycin post surgery. He is here with his wife for follow-up .     He denies fever, chills, tolerates Cefazolin. body ache has resolved. able to walk without any walking aid. still on Ibuprofen 400 mg po q8hr but had stopped Oxycodone. drinks alcohol periodically. had diarrhea recently but no abdominal pain. otherwise he is doing better.     Problem list and histories reviewed & adjusted, as indicated. Additional history: as documented    PAST MEDICAL HISTORY:  Patient  has a past medical history of GERD (gastroesophageal reflux disease), Hypertriglyceridemia, Major depressive disorder, TANJA (obstructive sleep apnea), and  Sclerosing mesenteritis (H).    PAST SURGICAL HISTORY:  Patient  has a past surgical history that includes back surgery (1996); Arthroscopy knee irrigation and debridement (Left, 10/21/2019); and Irrigation and debridement lower extremity, combined (Left, 10/29/2019).    CURRENT MEDICATIONS:  Current Outpatient Medications   Medication Sig Dispense Refill     acetaminophen (TYLENOL) 325 MG tablet Take 2 tablets (650 mg) by mouth every 4 hours (Patient not taking: Reported on 11/6/2019) 120 tablet 0     acetaminophen (TYLENOL) 500 MG tablet Take 2 tablets (1,000 mg) by mouth 3 times daily as needed for mild pain 50 tablet 0     aspirin (ASA) 325 MG EC tablet Take 1 tablet (325 mg) by mouth daily (Patient not taking: Reported on 11/6/2019) 14 tablet 0     hydrOXYzine (VISTARIL) 25 MG capsule Take 25 mg by mouth       omeprazole (PRILOSEC) 20 MG DR capsule Take 20 mg by mouth daily       ondansetron (ZOFRAN-ODT) 4 MG ODT tab Take 1-2 tablets (4-8 mg) by mouth every 8 hours as needed for nausea (Patient not taking: Reported on 11/6/2019) 4 tablet 0     oxyCODONE (ROXICODONE) 5 MG tablet Take 1-2 tablets (5-10 mg) by mouth every 4 hours as needed for moderate to severe pain 15 tablet 0     QUEtiapine (SEROQUEL) 25 MG tablet Take 25 mg by mouth At Bedtime       senna-docusate (SENOKOT-S/PERICOLACE) 8.6-50 MG tablet Take 1 tablet by mouth 2 times daily 24 tablet 0     ALLERGY:  Allergies   Allergen Reactions     Lexapro [Escitalopram] Angioedema     SOCIAL HISTORY:  Patient  reports that he has been smoking. He has been smoking about 0.00 packs per day. He has never used smokeless tobacco. He reports current alcohol use.    FAMILY HISTORY:  Patient's family history includes Unknown/Adopted in his father and mother.    Labs reviewed in EPIC    OBJECTIVE:                                                    BP (!) 163/97 (BP Location: Right arm, Patient Position: Sitting, Cuff Size: Adult Regular)   Pulse 99   Temp 97.9  F  (36.6  C)   Wt 103.4 kg (228 lb)   SpO2 98%   BMI 30.92 kg/m   Body mass index is 30.92 kg/m .   GENERAL: healthy, alert and no distress  EYES: Eyes grossly normal to inspection, PERRL and conjunctivae and sclerae normal  MS: left knee is significantly better, minimal swelling , no redness, non tender    SKIN: no suspicious lesions or rashes  NEURO: Normal strength and tone, mentation intact and speech normal  PSYCH: mentation appears normal  PICC line: good, non tender, no redness        ASSESSMENT AND PLAN:                                                      1. Left septic knee post arthroscopy with inside out repair of his lateral meniscus on 10/1/19   - left knee arthroscopic debridement and irrigation on 10/21/19.   - Staph caprae oxacillin sensitive, started on Cefazolin     2. PICC placement : 10/24/19      RECOMMENDATION:  - left knee surgical site - significant improvement    - continue Cefazolin 2 gram IV q8hr , Staph caprae is susceptible to Oxacillin.    - will recheck CRP next week and if it continues to improve, may stop Cefazolin on Nov 26 , remove PICC and switch to PO keflex 500 mg po q6hr x 1-2 weeks    - continue probiotic  daily   - weekly lab: CMP, CBC, CRP   - discussed potential side effects of antibiotics   - stop scheduled  Ibuprofen use , may use Oxycodone ( still hassome at home) or tylenol on as needed basis   - due to distance , he requests no follow-up but agrees to call if he has any concerns     Follow-up KIKE Nickerson MD, M.Med.Sc  Division of Infectious Diseases and International Medicine  [facility]

## 2019-11-20 NOTE — LETTER
11/20/2019      RE: Alexis Patiño  [address]  [place] [facility] [address]       INFECTIOUS DISEASES PROGRESS NOTE    Infectious Diseases Clinic     SUBJECTIVE:                                                      Alexis Patiño is a 55 yo male with history of heavy tobacco use, obesity, admitted on 10/21/19 for worsening left knee pain and swelling, fevers and sweating in the past 10 days s/p recent arthroscopy with inside out repair of his lateral meniscus on 10/1/19 with Dr Spain. ( first 12 days post surgery, he was doing well, tolerating PT and weight bearing) He went to ER and US was negative for DVT.  CRP> 32, WBC 10.7.  He later went to see Dr Spain's PA and a knee aspiration was done. fluid was purulent/ cloudy with >100,000 WBCs with PMN predominance. Cultures remained negative. He was hospitalized at [facility] from 10/21/19-10/24/19.  He had left knee arthroscopic debridement and irrigation on 10/21/19 and culture grew Staphylococcus  caprae in broth on day 2. It is oxacillin sensitive. He was initially on Vancomycin IV and discharged on Cefazolin 2 gram IV q8hr. He had low grade temperature 1004F on 10/26/19 with increasing left knee swelling. He had I+D abd extensive synovectomy suprapatellar pouch , medial gutter, lateral gutter and sutures removal on 10/29/19. Intra-op cultures - aerobic and anaerobic cultures remain negative. His antibiotic was switched to Daptomycin post surgery. He is here with his wife for follow-up .     He denies fever, chills, tolerates Cefazolin. body ache has resolved. able to walk without any walking aid. still on Ibuprofen 400 mg po q8hr but had stopped Oxycodone. drinks alcohol periodically. had diarrhea recently but no abdominal pain. otherwise he is doing better.     Problem list and histories reviewed & adjusted, as indicated. Additional history: as documented    PAST MEDICAL HISTORY:  Patient  has a past medical history of GERD (gastroesophageal reflux disease),  Hypertriglyceridemia, Major depressive disorder, TANJA (obstructive sleep apnea), and Sclerosing mesenteritis (H).    PAST SURGICAL HISTORY:  Patient  has a past surgical history that includes back surgery (1996); Arthroscopy knee irrigation and debridement (Left, 10/21/2019); and Irrigation and debridement lower extremity, combined (Left, 10/29/2019).    CURRENT MEDICATIONS:  Current Outpatient Medications   Medication Sig Dispense Refill     acetaminophen (TYLENOL) 325 MG tablet Take 2 tablets (650 mg) by mouth every 4 hours (Patient not taking: Reported on 11/6/2019) 120 tablet 0     acetaminophen (TYLENOL) 500 MG tablet Take 2 tablets (1,000 mg) by mouth 3 times daily as needed for mild pain 50 tablet 0     aspirin (ASA) 325 MG EC tablet Take 1 tablet (325 mg) by mouth daily (Patient not taking: Reported on 11/6/2019) 14 tablet 0     hydrOXYzine (VISTARIL) 25 MG capsule Take 25 mg by mouth       omeprazole (PRILOSEC) 20 MG DR capsule Take 20 mg by mouth daily       ondansetron (ZOFRAN-ODT) 4 MG ODT tab Take 1-2 tablets (4-8 mg) by mouth every 8 hours as needed for nausea (Patient not taking: Reported on 11/6/2019) 4 tablet 0     oxyCODONE (ROXICODONE) 5 MG tablet Take 1-2 tablets (5-10 mg) by mouth every 4 hours as needed for moderate to severe pain 15 tablet 0     QUEtiapine (SEROQUEL) 25 MG tablet Take 25 mg by mouth At Bedtime       senna-docusate (SENOKOT-S/PERICOLACE) 8.6-50 MG tablet Take 1 tablet by mouth 2 times daily 24 tablet 0     ALLERGY:  Allergies   Allergen Reactions     Lexapro [Escitalopram] Angioedema     SOCIAL HISTORY:  Patient  reports that he has been smoking. He has been smoking about 0.00 packs per day. He has never used smokeless tobacco. He reports current alcohol use.    FAMILY HISTORY:  Patient's family history includes Unknown/Adopted in his father and mother.    Labs reviewed in EPIC    OBJECTIVE:                                                    BP (!) 163/97 (BP Location: Right  arm, Patient Position: Sitting, Cuff Size: Adult Regular)   Pulse 99   Temp 97.9  F (36.6  C)   Wt 103.4 kg (228 lb)   SpO2 98%   BMI 30.92 kg/m    Body mass index is 30.92 kg/m .   GENERAL: healthy, alert and no distress  EYES: Eyes grossly normal to inspection, PERRL and conjunctivae and sclerae normal  MS: left knee is significantly better, minimal swelling , no redness, non tender    SKIN: no suspicious lesions or rashes  NEURO: Normal strength and tone, mentation intact and speech normal  PSYCH: mentation appears normal  PICC line: good, non tender, no redness        ASSESSMENT AND PLAN:                                                      1. Left septic knee post arthroscopy with inside out repair of his lateral meniscus on 10/1/19   - left knee arthroscopic debridement and irrigation on 10/21/19.   - Staph caprae oxacillin sensitive, started on Cefazolin     2. PICC placement : 10/24/19      RECOMMENDATION:  - left knee surgical site - significant improvement    - continue Cefazolin 2 gram IV q8hr , Staph caprae is susceptible to Oxacillin.    - will recheck CRP next week and if it continues to improve, may stop Cefazolin on Nov 26 , remove PICC and switch to PO keflex 500 mg po q6hr x 1-2 weeks    - continue probiotic  daily   - weekly lab: CMP, CBC, CRP   - discussed potential side effects of antibiotics   - stop scheduled  Ibuprofen use , may use Oxycodone ( still hassome at home) or tylenol on as needed basis   - due to distance , he requests no follow-up but agrees to call if he has any concerns     Follow-up PRN     Kale Nickerson MD, M.Med.Sc  Division of Infectious Diseases and International Medicine  [facility]            Kale Nickerson MD

## 2019-11-20 NOTE — PATIENT INSTRUCTIONS
- may take Tylenol 325-650 mg po every 12 hrs as needed   - avoid Ibuprofen /NSAIDs if possible  - if diarrhea , call so we can order test to check stools for C.difficile

## 2019-11-20 NOTE — NURSING NOTE
"Chief Complaint   Patient presents with     RECHECK     follow up         BP (!) 163/97 (BP Location: Right arm, Patient Position: Sitting, Cuff Size: Adult Regular)   Pulse 99   Temp 97.9  F (36.6  C)   Wt 103.4 kg (228 lb)   SpO2 98%   BMI 30.92 kg/m        Patient refused to review med list stating  \" I don't know - nothing has changed\".    Jef Cartwright, EMT    "

## 2019-11-25 ENCOUNTER — HOME INFUSION (PRE-WILLOW HOME INFUSION) (OUTPATIENT)
Dept: PHARMACY | Facility: CLINIC | Age: 54
End: 2019-11-25

## 2019-11-25 ENCOUNTER — MEDICAL CORRESPONDENCE (OUTPATIENT)
Dept: HEALTH INFORMATION MANAGEMENT | Facility: CLINIC | Age: 54
End: 2019-11-25

## 2019-11-25 LAB
ALBUMIN SERPL-MCNC: 3.5 G/DL (ref 3.4–5)
ALP SERPL-CCNC: 135 U/L (ref 40–150)
ALT SERPL W P-5'-P-CCNC: 12 U/L (ref 0–70)
ANION GAP SERPL CALCULATED.3IONS-SCNC: 3 MMOL/L (ref 3–14)
AST SERPL W P-5'-P-CCNC: 18 U/L (ref 0–45)
BASOPHILS # BLD AUTO: 0.1 10E9/L (ref 0–0.2)
BASOPHILS NFR BLD AUTO: 1.4 %
BILIRUB SERPL-MCNC: 0.6 MG/DL (ref 0.2–1.3)
BUN SERPL-MCNC: 18 MG/DL (ref 7–30)
CALCIUM SERPL-MCNC: 8.3 MG/DL (ref 8.5–10.1)
CHLORIDE SERPL-SCNC: 107 MMOL/L (ref 94–109)
CO2 SERPL-SCNC: 28 MMOL/L (ref 20–32)
CREAT SERPL-MCNC: 0.69 MG/DL (ref 0.66–1.25)
CRP SERPL-MCNC: 9.4 MG/L (ref 0–8)
DIFFERENTIAL METHOD BLD: ABNORMAL
EOSINOPHIL # BLD AUTO: 1.1 10E9/L (ref 0–0.7)
EOSINOPHIL NFR BLD AUTO: 11.8 %
ERYTHROCYTE [DISTWIDTH] IN BLOOD BY AUTOMATED COUNT: 12.8 % (ref 10–15)
ERYTHROCYTE [SEDIMENTATION RATE] IN BLOOD BY WESTERGREN METHOD: 9 MM/H (ref 0–20)
GFR SERPL CREATININE-BSD FRML MDRD: >90 ML/MIN/{1.73_M2}
GLUCOSE SERPL-MCNC: 121 MG/DL (ref 70–99)
HCT VFR BLD AUTO: 40.5 % (ref 40–53)
HGB BLD-MCNC: 13.1 G/DL (ref 13.3–17.7)
IMM GRANULOCYTES # BLD: 0 10E9/L (ref 0–0.4)
IMM GRANULOCYTES NFR BLD: 0.3 %
LYMPHOCYTES # BLD AUTO: 2.2 10E9/L (ref 0.8–5.3)
LYMPHOCYTES NFR BLD AUTO: 23.3 %
MCH RBC QN AUTO: 30.8 PG (ref 26.5–33)
MCHC RBC AUTO-ENTMCNC: 32.3 G/DL (ref 31.5–36.5)
MCV RBC AUTO: 95 FL (ref 78–100)
MONOCYTES # BLD AUTO: 1 10E9/L (ref 0–1.3)
MONOCYTES NFR BLD AUTO: 10.2 %
NEUTROPHILS # BLD AUTO: 5.1 10E9/L (ref 1.6–8.3)
NEUTROPHILS NFR BLD AUTO: 53 %
NRBC # BLD AUTO: 0 10*3/UL
NRBC BLD AUTO-RTO: 0 /100
PLATELET # BLD AUTO: 239 10E9/L (ref 150–450)
POTASSIUM SERPL-SCNC: 3.8 MMOL/L (ref 3.4–5.3)
PROT SERPL-MCNC: 7.1 G/DL (ref 6.8–8.8)
RBC # BLD AUTO: 4.25 10E12/L (ref 4.4–5.9)
SODIUM SERPL-SCNC: 138 MMOL/L (ref 133–144)
WBC # BLD AUTO: 9.6 10E9/L (ref 4–11)

## 2019-11-25 PROCEDURE — 80053 COMPREHEN METABOLIC PANEL: CPT | Performed by: INTERNAL MEDICINE

## 2019-11-25 PROCEDURE — 85025 COMPLETE CBC W/AUTO DIFF WBC: CPT | Performed by: INTERNAL MEDICINE

## 2019-11-25 PROCEDURE — 86140 C-REACTIVE PROTEIN: CPT | Performed by: INTERNAL MEDICINE

## 2019-11-25 PROCEDURE — 85652 RBC SED RATE AUTOMATED: CPT | Performed by: INTERNAL MEDICINE

## 2019-11-26 ENCOUNTER — TELEPHONE (OUTPATIENT)
Dept: INFECTIOUS DISEASES | Facility: CLINIC | Age: 54
End: 2019-11-26

## 2019-11-26 ENCOUNTER — HOME INFUSION (PRE-WILLOW HOME INFUSION) (OUTPATIENT)
Dept: PHARMACY | Facility: CLINIC | Age: 54
End: 2019-11-26

## 2019-11-26 NOTE — TELEPHONE ENCOUNTER
"See message below for additional information. Writer called [facility] and let them know the below.    Mari Eddy RN  -----------------------------------------------------    You  Kale Nickerson MD Just now (12:54 PM)      Alexis Calzada said he is doing well, no signs of infection or pain. He is comfortable stop IV antibiotics and getting his PICC removed. They have two more doses of IV antibiotics today (1:00pm and 9:00pm). I will call [facility] and tell them they can remove the PICC line tomorrow. Patient's wife, Arais, is wondering if there are any follow up labs that he needs to have done.     I am going to be leaving for the day so please make sure to send this to the pool.     Thanks,   Mari Eddy RN     Routing comment       Kale Nickerson MD  You 1 hour ago (11:45 AM)      how is he feeling. If knee looks good can stop at 6 weeks. can stop and remove PICC   Thanks     Routing comment       You  Kale Nickerson MD 1 hour ago (11:27 AM)      Dr. Crocker,           See message below. Your appointment note from 11/20/2019 said \"continue Cefazolin 2 gram IV q8hr , Staph pearlrae is susceptible to Oxacillin.     - will recheck CRP next week and if it continues to improve, may stop Cefazolin on Nov 26 , remove PICC and switch to PO keflex 500 mg po q6hr x 1-2 weeks. \"     CRP on 11/18/2019 was 5.4 and CRP on 11/25/2019 was 9.4.     Let me know how I can help.     Thanks,   Mari Eddy RN     Routing comment        "

## 2019-11-26 NOTE — PROGRESS NOTES
This is a recent snapshot of the patient's [facility] medical record.  For current drug dose and complete information and questions, call [phone] or In Basket pool, fv home infusion ([id number])  CSN Number:  [id number]

## 2019-11-26 NOTE — TELEPHONE ENCOUNTER
[facility] Call Center    Phone Message    May a detailed message be left on voicemail: yes    Reason for Call: Requesting Results   Name/type of test: CBC with platelets differential , Comprehensive metabolic panel , CRP inflammation, Erythrocyte sedimentation rate auto   Date of test: 11/25/19  Was test done at a location other than [facility] (Please fill in the location if not [facility])?: Yes: [facility]  **Pt's wife is called to see if her 's lab results are back. They are waiting to hear if he is going to continue taking anti-biotics. Please call back to discuss.**    Action Taken: Message routed to:  [facility] ID

## 2019-11-27 NOTE — PROGRESS NOTES
This is a recent snapshot of the patient's [facility] medical record.  For current drug dose and complete information and questions, call [phone] or In Basket pool, fv home infusion ([id number])  CSN Number:  [id number]

## 2019-11-29 ENCOUNTER — HOME INFUSION (PRE-WILLOW HOME INFUSION) (OUTPATIENT)
Dept: PHARMACY | Facility: CLINIC | Age: 54
End: 2019-11-29

## 2019-12-03 ENCOUNTER — THERAPY VISIT (OUTPATIENT)
Dept: PHYSICAL THERAPY | Facility: CLINIC | Age: 54
End: 2019-12-03
Payer: COMMERCIAL

## 2019-12-03 DIAGNOSIS — S83.282A TEAR OF LATERAL MENISCUS OF LEFT KNEE: ICD-10-CM

## 2019-12-03 DIAGNOSIS — Z47.89 AFTERCARE FOLLOWING SURGERY OF THE MUSCULOSKELETAL SYSTEM: ICD-10-CM

## 2019-12-03 PROCEDURE — 97110 THERAPEUTIC EXERCISES: CPT | Mod: GP | Performed by: PHYSICAL THERAPIST

## 2019-12-09 NOTE — PROGRESS NOTES
This is a recent snapshot of the patient's [facility] medical record.  For current drug dose and complete information and questions, call [phone] or In Basket pool, fv home infusion ([id number])  CSN Number:  [id number]

## 2019-12-10 ENCOUNTER — THERAPY VISIT (OUTPATIENT)
Dept: PHYSICAL THERAPY | Facility: CLINIC | Age: 54
End: 2019-12-10
Payer: COMMERCIAL

## 2019-12-10 DIAGNOSIS — Z47.89 AFTERCARE FOLLOWING SURGERY OF THE MUSCULOSKELETAL SYSTEM: ICD-10-CM

## 2019-12-10 DIAGNOSIS — S83.282A TEAR OF LATERAL MENISCUS OF LEFT KNEE: ICD-10-CM

## 2019-12-10 PROCEDURE — 97110 THERAPEUTIC EXERCISES: CPT | Mod: GP | Performed by: PHYSICAL THERAPIST

## 2019-12-13 ENCOUNTER — THERAPY VISIT (OUTPATIENT)
Dept: PHYSICAL THERAPY | Facility: CLINIC | Age: 54
End: 2019-12-13
Payer: COMMERCIAL

## 2019-12-13 DIAGNOSIS — Z47.89 AFTERCARE FOLLOWING SURGERY OF THE MUSCULOSKELETAL SYSTEM: ICD-10-CM

## 2019-12-13 DIAGNOSIS — S83.282A TEAR OF LATERAL MENISCUS OF LEFT KNEE: ICD-10-CM

## 2019-12-13 PROCEDURE — 97110 THERAPEUTIC EXERCISES: CPT | Mod: GP

## 2019-12-17 ENCOUNTER — THERAPY VISIT (OUTPATIENT)
Dept: PHYSICAL THERAPY | Facility: CLINIC | Age: 54
End: 2019-12-17
Payer: COMMERCIAL

## 2019-12-17 DIAGNOSIS — Z47.89 AFTERCARE FOLLOWING SURGERY OF THE MUSCULOSKELETAL SYSTEM: ICD-10-CM

## 2019-12-17 DIAGNOSIS — S83.282A TEAR OF LATERAL MENISCUS OF LEFT KNEE: ICD-10-CM

## 2019-12-17 PROCEDURE — 97110 THERAPEUTIC EXERCISES: CPT | Mod: GP | Performed by: PHYSICAL THERAPIST

## 2019-12-31 ENCOUNTER — THERAPY VISIT (OUTPATIENT)
Dept: PHYSICAL THERAPY | Facility: CLINIC | Age: 54
End: 2019-12-31
Payer: COMMERCIAL

## 2019-12-31 DIAGNOSIS — Z47.89 AFTERCARE FOLLOWING SURGERY OF THE MUSCULOSKELETAL SYSTEM: ICD-10-CM

## 2019-12-31 DIAGNOSIS — S83.282A TEAR OF LATERAL MENISCUS OF LEFT KNEE: ICD-10-CM

## 2019-12-31 PROCEDURE — 97110 THERAPEUTIC EXERCISES: CPT | Mod: GP | Performed by: PHYSICAL THERAPIST

## 2019-12-31 ASSESSMENT — ACTIVITIES OF DAILY LIVING (ADL)
SIT WITH YOUR KNEE BENT: ACTIVITY IS MINIMALLY DIFFICULT
AS_A_RESULT_OF_YOUR_KNEE_INJURY,_HOW_WOULD_YOU_RATE_YOUR_CURRENT_LEVEL_OF_DAILY_ACTIVITY?: NEARLY NORMAL
GIVING WAY, BUCKLING OR SHIFTING OF KNEE: I DO NOT HAVE THE SYMPTOM
GO DOWN STAIRS: ACTIVITY IS SOMEWHAT DIFFICULT
RAW_SCORE: 56
HOW_WOULD_YOU_RATE_THE_CURRENT_FUNCTION_OF_YOUR_KNEE_DURING_YOUR_USUAL_DAILY_ACTIVITIES_ON_A_SCALE_FROM_0_TO_100_WITH_100_BEING_YOUR_LEVEL_OF_KNEE_FUNCTION_PRIOR_TO_YOUR_INJURY_AND_0_BEING_THE_INABILITY_TO_PERFORM_ANY_OF_YOUR_USUAL_DAILY_ACTIVITIES?: 65
SWELLING: THE SYMPTOM AFFECTS MY ACTIVITY SLIGHTLY
RISE FROM A CHAIR: ACTIVITY IS MINIMALLY DIFFICULT
HOW_WOULD_YOU_RATE_THE_OVERALL_FUNCTION_OF_YOUR_KNEE_DURING_YOUR_USUAL_DAILY_ACTIVITIES?: NEARLY NORMAL
WALK: ACTIVITY IS NOT DIFFICULT
LIMPING: I DO NOT HAVE THE SYMPTOM
PAIN: I DO NOT HAVE THE SYMPTOM
GO UP STAIRS: ACTIVITY IS SOMEWHAT DIFFICULT
WEAKNESS: THE SYMPTOM AFFECTS MY ACTIVITY SLIGHTLY
STAND: ACTIVITY IS NOT DIFFICULT
SQUAT: ACTIVITY IS SOMEWHAT DIFFICULT
STIFFNESS: I DO NOT HAVE THE SYMPTOM
KNEE_ACTIVITY_OF_DAILY_LIVING_SUM: 56
KNEEL ON THE FRONT OF YOUR KNEE: ACTIVITY IS SOMEWHAT DIFFICULT
KNEE_ACTIVITY_OF_DAILY_LIVING_SCORE: 80

## 2019-12-31 NOTE — LETTER
[facility]  [address]  [facility] [address]  [phone]    2019    Re: Alexis Patiño   :   1965  MRN:  [id number]   REFERRING PHYSICIAN:   Bryan Spain    [facility]  Date of Initial Evaluation:  10/3/2019  Visits:  Rxs Used: 8  Reason for Referral:     Tear of lateral meniscus of left knee  Aftercare following surgery of the musculoskeletal system    DISCHARGE REPORT  Progress reporting period is from 10/3/19 to 19 (8 visits).       SUBJECTIVE  Subjective changes noted by patient:  Alexis is doing very well at this point.  He has minimal pain and swelling and reports improving strength and confidence in his knee.  He has no limits with walking and is able to stairclimb reciprocally, just not quite normally.  He reports that he can kneel on the knee, but it doesn't feel normally yet.       Current pain level is 1/10  .     Previous pain level was  Initial Pain level: 8/10.   Changes in function:  Yes (See Goal flowsheet attached for changes in current functional level)  Adverse reaction to treatment or activity: None    OBJECTIVE  Changes noted in objective findings:  Full ROM: 5-0-138.  Good quad strength.  Slight swelling.    ASSESSMENT/PLAN  Updated problem list and treatment plan: Diagnosis 1:  S/p Left knee scope menisectomy    STG/LTGs have been met or progress has been made towards goals:  Yes (See Goal flow sheet completed today.)  Assessment of Progress: The patient's condition is improving.  Self Management Plans:  Patient has been instructed in a home treatment program.  Alexis continues to require the following intervention to meet STG and LTG's:  PT intervention is no longer required to meet STG/LTG.    Recommendations:  This patient is ready to be discharged from therapy and continue their home treatment program.          Re: Alexis Patiño   :   1965                          Thank you for  your referral.    INQUIRIES  Therapist: Johan Arceo PT  [facility] [place]  [address]  [facility] [address]  Phone: [phone]  Fax: [phone]

## 2019-12-31 NOTE — PROGRESS NOTES
Subjective:  HPI                    Objective:  System    Physical Exam    General     ROS    Assessment/Plan:    DISCHARGE REPORT    Progress reporting period is from 10/3/19 to 12/31/19 (8 visits).       SUBJECTIVE  Subjective changes noted by patient:  Alexis is doing very well at this point.  He has minimal pain and swelling and reports improving strength and confidence in his knee.  He has no limits with walking and is able to stairclimb reciprocally, just not quite normally.  He reports that he can kneel on the knee, but it doesn't feel normally yet.       Current pain level is 1/10  .     Previous pain level was  Initial Pain level: 8/10.   Changes in function:  Yes (See Goal flowsheet attached for changes in current functional level)  Adverse reaction to treatment or activity: None    OBJECTIVE  Changes noted in objective findings:  Full ROM: 5-0-138.  Good quad strength.  Slight swelling.        ASSESSMENT/PLAN  Updated problem list and treatment plan: Diagnosis 1:  S/p Left knee scope menisectomy    STG/LTGs have been met or progress has been made towards goals:  Yes (See Goal flow sheet completed today.)  Assessment of Progress: The patient's condition is improving.  Self Management Plans:  Patient has been instructed in a home treatment program.    Alexis continues to require the following intervention to meet STG and LTG's:  PT intervention is no longer required to meet STG/LTG.    Recommendations:  This patient is ready to be discharged from therapy and continue their home treatment program.    Please refer to the daily flowsheet for treatment today, total treatment time and time spent performing 1:1 timed codes.

## 2020-01-10 ENCOUNTER — TELEPHONE (OUTPATIENT)
Dept: INTERNAL MEDICINE | Facility: CLINIC | Age: 55
End: 2020-01-10

## 2020-01-10 NOTE — TELEPHONE ENCOUNTER
"[facility] Call Center    Phone Message    May a detailed message be left on voicemail: no    Reason for Call: Other: . Angel Luis called from [facility] needing Dr Nickerson signature on a stat iv order that was put in on Nov 6, 2019 so [facility] can bill insurance co. Please call Angel Luis back at . Her name is pronounce as \"my-key\" she works for HPI dept at [facility]. Thank you!    Action Taken: Message routed to:  [facility] id  "

## 2020-01-13 NOTE — TELEPHONE ENCOUNTER
Spoke with Hugo from [facility], who requested that Dr. Crocker sign stat IV order from 11/06/2019 and then have it faxed back to them. Writer requested that Hugo refax stat IV order and writer will have Dr. Crocker sign it at clinic and fax it back to them this Wednesday, 01/15/2020.      Mari Eddy RN

## 2020-01-16 NOTE — PROGRESS NOTES
This is a recent snapshot of the patient's [facility] medical record.  For current drug dose and complete information and questions, call [phone] or In Basket pool, fv home infusion ([id number])  CSN Number:  [id number]

## 2020-01-23 NOTE — TELEPHONE ENCOUNTER
[facility] Call Center    Phone Message    May a detailed message be left on voicemail: yes    Reason for Call: Other: Nike from  home [facility] hosp[facility] care calling to follow up on stat IV order. Dr. Crocker to sign and fax it back to them at FX: [phone]     Action Taken: Message routed to:  [facility] ID

## 2020-01-27 NOTE — TELEPHONE ENCOUNTER
Per message below from Dr. Crocker, order was electronically signed and faxed back to [facility].    Mari Eddy RN  -----------------------------------------------------------------------------------------    Kale Nickerson MD  You 52 minutes ago (9:18 AM)      yes, we fax it back     Routing comment       You  Kale Nickerson MD 1 hour ago (8:27 AM)      Dr. Crocker,             Just following up on this. Did you sign the order through right fax and send it back to them?     Thanks,   Mari Eddy RN     Routing comment       You  Kale Nickerson MD 4 days ago      Dr. Crocker,          Did you receive the fax I forwarded to you from [facility]?     Thanks,   Mari Eddy RN     Routing comment       Kale Nickerson MD  You 4 days ago      Yes, all orders should be routed to me on line as with all other orders from [place].     Thanks   Obi     Routing comment       You  Kale Nickerson MD 4 days ago      Dr. Crocker,            See message below. You sign faxed forms from [place] on the computer and send them back, correct?     Thanks,   Mari Eddy RN     Routing comment

## (undated) DEVICE — STRAP KNEE/BODY 31143004

## (undated) DEVICE — SUCTION IRR STRYKERFLOW II W/TIP 250-070-520

## (undated) DEVICE — SOL NACL 0.9% IRRIG 3000ML BAG 2B7477

## (undated) DEVICE — SOL WATER IRRIG 1000ML BOTTLE 2F7114

## (undated) DEVICE — PREP DURAPREP 26ML APL 8630

## (undated) DEVICE — BLADE SHAVER ARTHRO 3.5MM FULL RADIUS C9248

## (undated) DEVICE — ESU PENCIL SMOKE EVAC W/ROCKER SWITCH 0703-047-000

## (undated) DEVICE — DRAPE STOCKINETTE IMPERVIOUS 12" 1587

## (undated) DEVICE — PACK LOWER EXTREMITY CUSTOM ASC

## (undated) DEVICE — SUCTION MANIFOLD DORNOCH ULTRA CART UL-CL500

## (undated) DEVICE — GLOVE PROTEXIS W/NEU-THERA 7.5  2D73TE75

## (undated) DEVICE — Device

## (undated) DEVICE — ESU HOLDER LAP INST DISP YELLOW SHORT 250MM H-PRO-250

## (undated) DEVICE — DECANTER TRANSFER DEVICE 2008S

## (undated) DEVICE — BLADE SHAVER ARTHRO 5.5MM GATOR 9260A

## (undated) DEVICE — DRSG PRIMAPORE 02X3" 7133

## (undated) DEVICE — IMM KNEE 20" 0814-2660

## (undated) DEVICE — CAST PADDING 4" STERILE 9044S

## (undated) DEVICE — SU ETHILON 3-0 PS-1 18" 1663H

## (undated) DEVICE — SPECIMEN CONTAINER 5OZ STERILE 2600SA

## (undated) DEVICE — TOURNIQUET CUFF 34" REPRO BROWN 60-7070-106

## (undated) DEVICE — GOWN IMPERVIOUS SPECIALTY XLG/XLONG 32474

## (undated) DEVICE — SOL NACL 0.9% IRRIG 1000ML BOTTLE 2F7124

## (undated) DEVICE — GLOVE PROTEXIS POWDER FREE SMT 8.0  2D72PT80X

## (undated) DEVICE — ESU ELEC VAPR PREMIER RF 90DEG 3.7MM 227204

## (undated) DEVICE — TUBING ARTHRO CONMED/LINVATEC PUMP BLUE INFLOW 10K100

## (undated) DEVICE — LINEN TOWEL PACK X5 5464

## (undated) DEVICE — NDL 18GA 1.5" 305196

## (undated) DEVICE — LINEN ORTHO PACK 5446

## (undated) DEVICE — GLOVE PROTEXIS BLUE W/NEU-THERA 8.0  2D73EB80

## (undated) DEVICE — SUCTION MANIFOLD NEPTUNE 2 SYS 1 PORT 702-025-000

## (undated) RX ORDER — FENTANYL CITRATE 50 UG/ML
INJECTION, SOLUTION INTRAMUSCULAR; INTRAVENOUS
Status: DISPENSED
Start: 2019-10-21

## (undated) RX ORDER — PROPOFOL 10 MG/ML
INJECTION, EMULSION INTRAVENOUS
Status: DISPENSED
Start: 2019-10-29

## (undated) RX ORDER — CEFAZOLIN SODIUM 1 G/3ML
INJECTION, POWDER, FOR SOLUTION INTRAMUSCULAR; INTRAVENOUS
Status: DISPENSED
Start: 2019-10-21

## (undated) RX ORDER — HYDROMORPHONE HYDROCHLORIDE 1 MG/ML
INJECTION, SOLUTION INTRAMUSCULAR; INTRAVENOUS; SUBCUTANEOUS
Status: DISPENSED
Start: 2019-10-21

## (undated) RX ORDER — BUPIVACAINE HYDROCHLORIDE 2.5 MG/ML
INJECTION, SOLUTION EPIDURAL; INFILTRATION; INTRACAUDAL
Status: DISPENSED
Start: 2019-10-29

## (undated) RX ORDER — DEXAMETHASONE SODIUM PHOSPHATE 4 MG/ML
INJECTION, SOLUTION INTRA-ARTICULAR; INTRALESIONAL; INTRAMUSCULAR; INTRAVENOUS; SOFT TISSUE
Status: DISPENSED
Start: 2019-10-21

## (undated) RX ORDER — LIDOCAINE HYDROCHLORIDE 20 MG/ML
INJECTION, SOLUTION EPIDURAL; INFILTRATION; INTRACAUDAL; PERINEURAL
Status: DISPENSED
Start: 2019-10-29

## (undated) RX ORDER — LABETALOL 20 MG/4 ML (5 MG/ML) INTRAVENOUS SYRINGE
Status: DISPENSED
Start: 2019-10-29

## (undated) RX ORDER — FENTANYL CITRATE 50 UG/ML
INJECTION, SOLUTION INTRAMUSCULAR; INTRAVENOUS
Status: DISPENSED
Start: 2019-10-29

## (undated) RX ORDER — GABAPENTIN 300 MG/1
CAPSULE ORAL
Status: DISPENSED
Start: 2019-10-29

## (undated) RX ORDER — LIDOCAINE HYDROCHLORIDE 20 MG/ML
INJECTION, SOLUTION EPIDURAL; INFILTRATION; INTRACAUDAL; PERINEURAL
Status: DISPENSED
Start: 2019-10-21

## (undated) RX ORDER — OXYCODONE HYDROCHLORIDE 5 MG/1
TABLET ORAL
Status: DISPENSED
Start: 2019-10-21

## (undated) RX ORDER — EPINEPHRINE 1 MG/ML
INJECTION, SOLUTION, CONCENTRATE INTRAVENOUS
Status: DISPENSED
Start: 2019-10-29

## (undated) RX ORDER — PROPOFOL 10 MG/ML
INJECTION, EMULSION INTRAVENOUS
Status: DISPENSED
Start: 2019-10-21

## (undated) RX ORDER — MEPERIDINE HYDROCHLORIDE 25 MG/ML
INJECTION INTRAMUSCULAR; INTRAVENOUS; SUBCUTANEOUS
Status: DISPENSED
Start: 2019-10-29

## (undated) RX ORDER — OXYCODONE HYDROCHLORIDE 10 MG/1
TABLET ORAL
Status: DISPENSED
Start: 2019-10-29

## (undated) RX ORDER — ACETAMINOPHEN 325 MG/1
TABLET ORAL
Status: DISPENSED
Start: 2019-10-21

## (undated) RX ORDER — ACETAMINOPHEN 325 MG/1
TABLET ORAL
Status: DISPENSED
Start: 2019-10-29

## (undated) RX ORDER — BUPIVACAINE HYDROCHLORIDE AND EPINEPHRINE 2.5; 5 MG/ML; UG/ML
INJECTION, SOLUTION EPIDURAL; INFILTRATION; INTRACAUDAL; PERINEURAL
Status: DISPENSED
Start: 2019-10-21

## (undated) RX ORDER — ONDANSETRON 2 MG/ML
INJECTION INTRAMUSCULAR; INTRAVENOUS
Status: DISPENSED
Start: 2019-10-21

## (undated) RX ORDER — KETAMINE HCL IN 0.9 % NACL 50 MG/5 ML
SYRINGE (ML) INTRAVENOUS
Status: DISPENSED
Start: 2019-10-29